# Patient Record
Sex: FEMALE | Race: WHITE | NOT HISPANIC OR LATINO | URBAN - METROPOLITAN AREA
[De-identification: names, ages, dates, MRNs, and addresses within clinical notes are randomized per-mention and may not be internally consistent; named-entity substitution may affect disease eponyms.]

---

## 2017-08-23 ENCOUNTER — OUTPATIENT (OUTPATIENT)
Dept: OUTPATIENT SERVICES | Facility: HOSPITAL | Age: 77
LOS: 1 days | Discharge: HOME | End: 2017-08-23

## 2017-08-23 DIAGNOSIS — Z12.31 ENCOUNTER FOR SCREENING MAMMOGRAM FOR MALIGNANT NEOPLASM OF BREAST: ICD-10-CM

## 2017-08-23 PROBLEM — Z00.00 ENCOUNTER FOR PREVENTIVE HEALTH EXAMINATION: Status: ACTIVE | Noted: 2017-08-23

## 2017-09-20 ENCOUNTER — APPOINTMENT (OUTPATIENT)
Dept: CARDIOLOGY | Facility: CLINIC | Age: 77
End: 2017-09-20

## 2017-09-20 VITALS
HEIGHT: 61 IN | DIASTOLIC BLOOD PRESSURE: 60 MMHG | SYSTOLIC BLOOD PRESSURE: 112 MMHG | HEART RATE: 65 BPM | WEIGHT: 123 LBS | BODY MASS INDEX: 23.22 KG/M2

## 2017-09-20 DIAGNOSIS — E78.2 MIXED HYPERLIPIDEMIA: ICD-10-CM

## 2017-09-20 DIAGNOSIS — K21.9 GASTRO-ESOPHAGEAL REFLUX DISEASE W/OUT ESOPHAGITIS: ICD-10-CM

## 2017-09-20 DIAGNOSIS — K52.9 NONINFECTIVE GASTROENTERITIS AND COLITIS, UNSPECIFIED: ICD-10-CM

## 2017-09-20 DIAGNOSIS — Z87.891 PERSONAL HISTORY OF NICOTINE DEPENDENCE: ICD-10-CM

## 2017-09-20 RX ORDER — ASPIRIN 81 MG
81 TABLET, DELAYED RELEASE (ENTERIC COATED) ORAL DAILY
Refills: 0 | Status: ACTIVE | COMMUNITY

## 2017-09-20 RX ORDER — ENALAPRIL MALEATE 10 MG/1
10 TABLET ORAL DAILY
Refills: 0 | Status: ACTIVE | COMMUNITY

## 2017-09-20 RX ORDER — ATORVASTATIN CALCIUM 40 MG/1
40 TABLET, FILM COATED ORAL
Qty: 30 | Refills: 3 | Status: ACTIVE | COMMUNITY

## 2017-10-11 ENCOUNTER — APPOINTMENT (OUTPATIENT)
Dept: VASCULAR SURGERY | Facility: CLINIC | Age: 77
End: 2017-10-11
Payer: MEDICARE

## 2017-10-11 ENCOUNTER — OTHER (OUTPATIENT)
Age: 77
End: 2017-10-11

## 2017-10-11 VITALS
BODY MASS INDEX: 23.22 KG/M2 | HEIGHT: 61 IN | DIASTOLIC BLOOD PRESSURE: 60 MMHG | SYSTOLIC BLOOD PRESSURE: 110 MMHG | OXYGEN SATURATION: 98 % | WEIGHT: 123 LBS | HEART RATE: 65 BPM

## 2017-10-11 DIAGNOSIS — I25.2 OLD MYOCARDIAL INFARCTION: ICD-10-CM

## 2017-10-11 PROCEDURE — 99203 OFFICE O/P NEW LOW 30 MIN: CPT

## 2017-10-11 PROCEDURE — 93925 LOWER EXTREMITY STUDY: CPT

## 2017-10-11 RX ORDER — OMEPRAZOLE 40 MG/1
40 CAPSULE, DELAYED RELEASE ORAL
Qty: 30 | Refills: 3 | Status: DISCONTINUED | COMMUNITY
End: 2017-10-11

## 2017-10-11 RX ORDER — BACILLUS COAGULANS/INULIN 1B-250 MG
CAPSULE ORAL
Refills: 0 | Status: DISCONTINUED | COMMUNITY
End: 2017-10-11

## 2017-10-18 ENCOUNTER — APPOINTMENT (OUTPATIENT)
Dept: CARDIOLOGY | Facility: CLINIC | Age: 77
End: 2017-10-18

## 2018-07-17 ENCOUNTER — APPOINTMENT (OUTPATIENT)
Dept: OBGYN | Facility: CLINIC | Age: 78
End: 2018-07-17

## 2018-11-28 ENCOUNTER — APPOINTMENT (OUTPATIENT)
Dept: OBGYN | Facility: CLINIC | Age: 78
End: 2018-11-28
Payer: MEDICARE

## 2018-11-28 VITALS
HEIGHT: 61 IN | WEIGHT: 138 LBS | BODY MASS INDEX: 26.06 KG/M2 | SYSTOLIC BLOOD PRESSURE: 150 MMHG | DIASTOLIC BLOOD PRESSURE: 90 MMHG

## 2018-11-28 DIAGNOSIS — Z80.1 FAMILY HISTORY OF MALIGNANT NEOPLASM OF TRACHEA, BRONCHUS AND LUNG: ICD-10-CM

## 2018-11-28 DIAGNOSIS — M19.90 UNSPECIFIED OSTEOARTHRITIS, UNSPECIFIED SITE: ICD-10-CM

## 2018-11-28 LAB
BILIRUB UR QL STRIP: NORMAL
GLUCOSE UR-MCNC: NORMAL
HCG UR QL: 0.2 EU/DL
HGB UR QL STRIP.AUTO: NORMAL
KETONES UR-MCNC: NORMAL
LEUKOCYTE ESTERASE UR QL STRIP: NORMAL
NITRITE UR QL STRIP: NORMAL
PH UR STRIP: 5
PROT UR STRIP-MCNC: NORMAL
SP GR UR STRIP: 1.02

## 2018-11-28 PROCEDURE — 99397 PER PM REEVAL EST PAT 65+ YR: CPT

## 2018-11-28 PROCEDURE — 81003 URINALYSIS AUTO W/O SCOPE: CPT | Mod: QW

## 2018-12-13 ENCOUNTER — APPOINTMENT (OUTPATIENT)
Dept: OBGYN | Facility: CLINIC | Age: 78
End: 2018-12-13
Payer: MEDICARE

## 2018-12-13 PROCEDURE — 77085 DXA BONE DENSITY AXL VRT FX: CPT

## 2019-01-02 ENCOUNTER — RX RENEWAL (OUTPATIENT)
Age: 79
End: 2019-01-02

## 2019-01-02 DIAGNOSIS — M85.852 OTHER SPECIFIED DISORDERS OF BONE DENSITY AND STRUCTURE, LEFT THIGH: ICD-10-CM

## 2019-12-09 ENCOUNTER — RX RENEWAL (OUTPATIENT)
Age: 79
End: 2019-12-09

## 2020-11-15 ENCOUNTER — TRANSCRIPTION ENCOUNTER (OUTPATIENT)
Age: 80
End: 2020-11-15

## 2020-11-18 ENCOUNTER — RX RENEWAL (OUTPATIENT)
Age: 80
End: 2020-11-18

## 2020-12-04 ENCOUNTER — OUTPATIENT (OUTPATIENT)
Dept: OUTPATIENT SERVICES | Facility: HOSPITAL | Age: 80
LOS: 1 days | Discharge: HOME | End: 2020-12-04
Payer: MEDICARE

## 2020-12-04 DIAGNOSIS — Z12.31 ENCOUNTER FOR SCREENING MAMMOGRAM FOR MALIGNANT NEOPLASM OF BREAST: ICD-10-CM

## 2020-12-04 PROCEDURE — 77063 BREAST TOMOSYNTHESIS BI: CPT | Mod: 26

## 2020-12-04 PROCEDURE — 77067 SCR MAMMO BI INCL CAD: CPT | Mod: 26

## 2021-07-12 ENCOUNTER — APPOINTMENT (OUTPATIENT)
Age: 81
End: 2021-07-12
Payer: MEDICARE

## 2021-07-12 ENCOUNTER — NON-APPOINTMENT (OUTPATIENT)
Age: 81
End: 2021-07-12

## 2021-07-12 VITALS
HEIGHT: 61 IN | HEART RATE: 76 BPM | RESPIRATION RATE: 14 BRPM | DIASTOLIC BLOOD PRESSURE: 88 MMHG | SYSTOLIC BLOOD PRESSURE: 144 MMHG | WEIGHT: 139 LBS | BODY MASS INDEX: 26.24 KG/M2 | OXYGEN SATURATION: 97 %

## 2021-07-12 PROCEDURE — 99203 OFFICE O/P NEW LOW 30 MIN: CPT | Mod: 25

## 2021-07-12 PROCEDURE — 71046 X-RAY EXAM CHEST 2 VIEWS: CPT

## 2021-07-12 RX ORDER — BUDESONIDE AND FORMOTEROL FUMARATE DIHYDRATE 160; 4.5 UG/1; UG/1
160-4.5 AEROSOL RESPIRATORY (INHALATION) TWICE DAILY
Qty: 1 | Refills: 3 | Status: ACTIVE | COMMUNITY
Start: 2021-07-12 | End: 1900-01-01

## 2021-07-12 RX ORDER — ALBUTEROL SULFATE 90 UG/1
108 (90 BASE) AEROSOL, METERED RESPIRATORY (INHALATION)
Qty: 1 | Refills: 2 | Status: ACTIVE | COMMUNITY
Start: 2021-07-12

## 2021-07-12 NOTE — PROCEDURE
[FreeTextEntry1] : CXR PA and Lateral \par The costophrenic and cardiophrenic angles are sharp\par The louie parenchyma shows increased bibasilar interstitial opacities.  No consolidations, or nodules \par The Mediastinum is within normal limits\par No pleural effusions\par

## 2021-07-12 NOTE — HISTORY OF PRESENT ILLNESS
[Dyspnea on Exertion] : dyspnea on exertion [None] : The patient is not currently being treated for this problem [Initial Evaluation] : an initial evaluation of [Cough] : cough [Non-Productive Cough] : non-productive cough [Currently Experiencing] : The patient is currently experiencing symptoms.

## 2021-07-12 NOTE — ASSESSMENT
[FreeTextEntry1] : HO moderate COPD \par Was not seen in 5 years\par Increasing cough likely COPD \par Significant smoking history quit 12 years ago \par Being treated for GERD

## 2021-08-23 ENCOUNTER — APPOINTMENT (OUTPATIENT)
Age: 81
End: 2021-08-23
Payer: MEDICARE

## 2021-08-23 VITALS
WEIGHT: 140 LBS | RESPIRATION RATE: 14 BRPM | HEIGHT: 61 IN | BODY MASS INDEX: 26.43 KG/M2 | SYSTOLIC BLOOD PRESSURE: 124 MMHG | OXYGEN SATURATION: 96 % | DIASTOLIC BLOOD PRESSURE: 80 MMHG | HEART RATE: 57 BPM

## 2021-08-23 PROCEDURE — 99214 OFFICE O/P EST MOD 30 MIN: CPT

## 2021-08-23 NOTE — HISTORY OF PRESENT ILLNESS
[Doing Well] : doing well [None] : The patient is not currently on any medications for ~his/her~ COPD [Adherent] : the patient is adherent with ~his/her~ medication regimen [Goals--Doing Well] : the patient is doing well with ~his/her~ COPD goals [PFTs] : pulmonary function tests [Initial Evaluation] : an initial evaluation of [Cough] : no cough [Non-Productive Cough] : no non-productive cough [Currently Experiencing] : The patient is currently experiencing symptoms.

## 2021-08-23 NOTE — ASSESSMENT
[FreeTextEntry1] : HO moderate COPD \par Increasing cough likely secondary to COPD.  Resolved with therapy  \par Significant smoking history quit 12 years ago \par Being treated for GERD

## 2021-10-18 ENCOUNTER — APPOINTMENT (OUTPATIENT)
Dept: OBGYN | Facility: CLINIC | Age: 81
End: 2021-10-18
Payer: MEDICARE

## 2021-10-18 VITALS
SYSTOLIC BLOOD PRESSURE: 128 MMHG | HEIGHT: 61 IN | DIASTOLIC BLOOD PRESSURE: 72 MMHG | BODY MASS INDEX: 26.43 KG/M2 | TEMPERATURE: 98.1 F | WEIGHT: 140 LBS

## 2021-10-18 DIAGNOSIS — Z01.419 ENCOUNTER FOR GYNECOLOGICAL EXAMINATION (GENERAL) (ROUTINE) W/OUT ABNORMAL FINDINGS: ICD-10-CM

## 2021-10-18 PROCEDURE — 99397 PER PM REEVAL EST PAT 65+ YR: CPT

## 2022-01-15 ENCOUNTER — INPATIENT (INPATIENT)
Facility: HOSPITAL | Age: 82
LOS: 2 days | Discharge: HOME | End: 2022-01-18
Attending: COLON & RECTAL SURGERY | Admitting: COLON & RECTAL SURGERY
Payer: MEDICARE

## 2022-01-15 VITALS
TEMPERATURE: 98 F | WEIGHT: 138.01 LBS | HEART RATE: 84 BPM | DIASTOLIC BLOOD PRESSURE: 64 MMHG | RESPIRATION RATE: 18 BRPM | SYSTOLIC BLOOD PRESSURE: 116 MMHG | OXYGEN SATURATION: 98 %

## 2022-01-15 DIAGNOSIS — K56.609 UNSPECIFIED INTESTINAL OBSTRUCTION, UNSPECIFIED AS TO PARTIAL VERSUS COMPLETE OBSTRUCTION: ICD-10-CM

## 2022-01-15 LAB
ALBUMIN SERPL ELPH-MCNC: 4.5 G/DL — SIGNIFICANT CHANGE UP (ref 3.5–5.2)
ALP SERPL-CCNC: 76 U/L — SIGNIFICANT CHANGE UP (ref 30–115)
ALT FLD-CCNC: 20 U/L — SIGNIFICANT CHANGE UP (ref 0–41)
ANION GAP SERPL CALC-SCNC: 13 MMOL/L — SIGNIFICANT CHANGE UP (ref 7–14)
APPEARANCE UR: CLEAR — SIGNIFICANT CHANGE UP
AST SERPL-CCNC: 26 U/L — SIGNIFICANT CHANGE UP (ref 0–41)
BACTERIA # UR AUTO: ABNORMAL
BASOPHILS # BLD AUTO: 0.05 K/UL — SIGNIFICANT CHANGE UP (ref 0–0.2)
BASOPHILS NFR BLD AUTO: 0.5 % — SIGNIFICANT CHANGE UP (ref 0–1)
BILIRUB SERPL-MCNC: 0.4 MG/DL — SIGNIFICANT CHANGE UP (ref 0.2–1.2)
BILIRUB UR-MCNC: NEGATIVE — SIGNIFICANT CHANGE UP
BUN SERPL-MCNC: 39 MG/DL — HIGH (ref 10–20)
CALCIUM SERPL-MCNC: 10.1 MG/DL — SIGNIFICANT CHANGE UP (ref 8.5–10.1)
CHLORIDE SERPL-SCNC: 99 MMOL/L — SIGNIFICANT CHANGE UP (ref 98–110)
CO2 SERPL-SCNC: 28 MMOL/L — SIGNIFICANT CHANGE UP (ref 17–32)
COLOR SPEC: YELLOW — SIGNIFICANT CHANGE UP
CREAT SERPL-MCNC: 1.7 MG/DL — HIGH (ref 0.7–1.5)
DIFF PNL FLD: NEGATIVE — SIGNIFICANT CHANGE UP
EOSINOPHIL # BLD AUTO: 0.12 K/UL — SIGNIFICANT CHANGE UP (ref 0–0.7)
EOSINOPHIL NFR BLD AUTO: 1.2 % — SIGNIFICANT CHANGE UP (ref 0–8)
EPI CELLS # UR: ABNORMAL /HPF
GLUCOSE SERPL-MCNC: 117 MG/DL — HIGH (ref 70–99)
GLUCOSE UR QL: NEGATIVE MG/DL — SIGNIFICANT CHANGE UP
HCT VFR BLD CALC: 43.2 % — SIGNIFICANT CHANGE UP (ref 37–47)
HGB BLD-MCNC: 14.3 G/DL — SIGNIFICANT CHANGE UP (ref 12–16)
IMM GRANULOCYTES NFR BLD AUTO: 0.4 % — HIGH (ref 0.1–0.3)
KETONES UR-MCNC: 15
LACTATE SERPL-SCNC: 0.9 MMOL/L — SIGNIFICANT CHANGE UP (ref 0.7–2)
LEUKOCYTE ESTERASE UR-ACNC: NEGATIVE — SIGNIFICANT CHANGE UP
LIDOCAIN IGE QN: 41 U/L — SIGNIFICANT CHANGE UP (ref 7–60)
LYMPHOCYTES # BLD AUTO: 19.2 % — LOW (ref 20.5–51.1)
LYMPHOCYTES # BLD AUTO: 2 K/UL — SIGNIFICANT CHANGE UP (ref 1.2–3.4)
MCHC RBC-ENTMCNC: 31.6 PG — HIGH (ref 27–31)
MCHC RBC-ENTMCNC: 33.1 G/DL — SIGNIFICANT CHANGE UP (ref 32–37)
MCV RBC AUTO: 95.6 FL — SIGNIFICANT CHANGE UP (ref 81–99)
MONOCYTES # BLD AUTO: 1.09 K/UL — HIGH (ref 0.1–0.6)
MONOCYTES NFR BLD AUTO: 10.5 % — HIGH (ref 1.7–9.3)
NEUTROPHILS # BLD AUTO: 7.12 K/UL — HIGH (ref 1.4–6.5)
NEUTROPHILS NFR BLD AUTO: 68.2 % — SIGNIFICANT CHANGE UP (ref 42.2–75.2)
NITRITE UR-MCNC: NEGATIVE — SIGNIFICANT CHANGE UP
NRBC # BLD: 0 /100 WBCS — SIGNIFICANT CHANGE UP (ref 0–0)
PH UR: 6.5 — SIGNIFICANT CHANGE UP (ref 5–8)
PLATELET # BLD AUTO: 354 K/UL — SIGNIFICANT CHANGE UP (ref 130–400)
POTASSIUM SERPL-MCNC: 4.7 MMOL/L — SIGNIFICANT CHANGE UP (ref 3.5–5)
POTASSIUM SERPL-SCNC: 4.7 MMOL/L — SIGNIFICANT CHANGE UP (ref 3.5–5)
PROT SERPL-MCNC: 7.8 G/DL — SIGNIFICANT CHANGE UP (ref 6–8)
PROT UR-MCNC: 30 MG/DL
RBC # BLD: 4.52 M/UL — SIGNIFICANT CHANGE UP (ref 4.2–5.4)
RBC # FLD: 13.2 % — SIGNIFICANT CHANGE UP (ref 11.5–14.5)
SARS-COV-2 RNA SPEC QL NAA+PROBE: SIGNIFICANT CHANGE UP
SODIUM SERPL-SCNC: 140 MMOL/L — SIGNIFICANT CHANGE UP (ref 135–146)
SP GR SPEC: 1.01 — SIGNIFICANT CHANGE UP (ref 1.01–1.03)
TROPONIN T SERPL-MCNC: <0.01 NG/ML — SIGNIFICANT CHANGE UP
UROBILINOGEN FLD QL: 1 MG/DL
WBC # BLD: 10.42 K/UL — SIGNIFICANT CHANGE UP (ref 4.8–10.8)
WBC # FLD AUTO: 10.42 K/UL — SIGNIFICANT CHANGE UP (ref 4.8–10.8)

## 2022-01-15 PROCEDURE — 93010 ELECTROCARDIOGRAM REPORT: CPT

## 2022-01-15 PROCEDURE — 74177 CT ABD & PELVIS W/CONTRAST: CPT | Mod: 26,MA

## 2022-01-15 PROCEDURE — 71045 X-RAY EXAM CHEST 1 VIEW: CPT | Mod: 26

## 2022-01-15 PROCEDURE — 99285 EMERGENCY DEPT VISIT HI MDM: CPT

## 2022-01-15 PROCEDURE — 99283 EMERGENCY DEPT VISIT LOW MDM: CPT

## 2022-01-15 RX ORDER — MORPHINE SULFATE 50 MG/1
4 CAPSULE, EXTENDED RELEASE ORAL ONCE
Refills: 0 | Status: DISCONTINUED | OUTPATIENT
Start: 2022-01-15 | End: 2022-01-15

## 2022-01-15 RX ORDER — ONDANSETRON 8 MG/1
4 TABLET, FILM COATED ORAL ONCE
Refills: 0 | Status: COMPLETED | OUTPATIENT
Start: 2022-01-15 | End: 2022-01-15

## 2022-01-15 RX ORDER — SODIUM CHLORIDE 9 MG/ML
1000 INJECTION INTRAMUSCULAR; INTRAVENOUS; SUBCUTANEOUS ONCE
Refills: 0 | Status: COMPLETED | OUTPATIENT
Start: 2022-01-15 | End: 2022-01-15

## 2022-01-15 RX ORDER — BUDESONIDE AND FORMOTEROL FUMARATE DIHYDRATE 160; 4.5 UG/1; UG/1
2 AEROSOL RESPIRATORY (INHALATION)
Refills: 0 | Status: DISCONTINUED | OUTPATIENT
Start: 2022-01-15 | End: 2022-01-18

## 2022-01-15 RX ORDER — HYDROMORPHONE HYDROCHLORIDE 2 MG/ML
1 INJECTION INTRAMUSCULAR; INTRAVENOUS; SUBCUTANEOUS EVERY 4 HOURS
Refills: 0 | Status: DISCONTINUED | OUTPATIENT
Start: 2022-01-15 | End: 2022-01-16

## 2022-01-15 RX ORDER — SODIUM CHLORIDE 9 MG/ML
1000 INJECTION, SOLUTION INTRAVENOUS
Refills: 0 | Status: DISCONTINUED | OUTPATIENT
Start: 2022-01-15 | End: 2022-01-17

## 2022-01-15 RX ORDER — ONDANSETRON 8 MG/1
4 TABLET, FILM COATED ORAL EVERY 6 HOURS
Refills: 0 | Status: DISCONTINUED | OUTPATIENT
Start: 2022-01-15 | End: 2022-01-16

## 2022-01-15 RX ORDER — HEPARIN SODIUM 5000 [USP'U]/ML
5000 INJECTION INTRAVENOUS; SUBCUTANEOUS EVERY 12 HOURS
Refills: 0 | Status: DISCONTINUED | OUTPATIENT
Start: 2022-01-15 | End: 2022-01-16

## 2022-01-15 RX ORDER — PANTOPRAZOLE SODIUM 20 MG/1
40 TABLET, DELAYED RELEASE ORAL DAILY
Refills: 0 | Status: DISCONTINUED | OUTPATIENT
Start: 2022-01-15 | End: 2022-01-18

## 2022-01-15 RX ADMIN — SODIUM CHLORIDE 100 MILLILITER(S): 9 INJECTION, SOLUTION INTRAVENOUS at 22:31

## 2022-01-15 RX ADMIN — ONDANSETRON 4 MILLIGRAM(S): 8 TABLET, FILM COATED ORAL at 18:07

## 2022-01-15 RX ADMIN — SODIUM CHLORIDE 1000 MILLILITER(S): 9 INJECTION INTRAMUSCULAR; INTRAVENOUS; SUBCUTANEOUS at 18:07

## 2022-01-15 RX ADMIN — HYDROMORPHONE HYDROCHLORIDE 1 MILLIGRAM(S): 2 INJECTION INTRAMUSCULAR; INTRAVENOUS; SUBCUTANEOUS at 22:27

## 2022-01-15 RX ADMIN — MORPHINE SULFATE 4 MILLIGRAM(S): 50 CAPSULE, EXTENDED RELEASE ORAL at 18:07

## 2022-01-15 NOTE — ED PROVIDER NOTE - PHYSICAL EXAMINATION
Physical Exam    Vital Signs: I have reviewed the initial vital signs.  Constitutional: well-nourished, appears stated age, no acute distress  Eyes: Conjunctiva pink, Sclera clear.  Cardiovascular: S1 and S2, regular rate, regular rhythm, well-perfused extremities, radial pulses equal and 2+  Respiratory: unlabored respiratory effort, clear to auscultation bilaterally no wheezing, rales and rhonchi  Gastrointestinal: soft, epigastric ttp, no guarding or rebound, no pulsatile mass, normal bowl sounds  Musculoskeletal: supple neck, no lower extremity edema, no midline tenderness  Integumentary: warm, dry, no rash  Neurologic: awake, alert, nvi

## 2022-01-15 NOTE — H&P ADULT - PROBLEM SELECTOR PLAN 1
NPO  NGT LCWS  IVF  LR  100 CC/ HR  PAIN MGMT  MORPHINE  2  MG Q 4  HR  PRN  ZOFRAN PRN  NAUSEA  DVT  / GI  PROPHYLAXIS  WILL FOLLOW

## 2022-01-15 NOTE — ED PROVIDER NOTE - CLINICAL SUMMARY MEDICAL DECISION MAKING FREE TEXT BOX
81 year old female with a pmh htn hld gerd copd w/ a history of 2 years of rlq abdominal pain (states has seen multiple specialist told it was muscular) presents here c/o diffuse abdominal pain that began last night, improved and reoccured today, Currently pain is 4/10 dull in nature associated with an episode of vomiting VS reviewed. Labs imaging ekg obtained and reviewed. CT demonstrated SBO - surgery consulted NG tube placed. patient admitted.

## 2022-01-15 NOTE — H&P ADULT - ATTENDING COMMENTS
Patient is a 81F with PMH of HTN, HLD, COPD who presents with abdominal pain found to have SBO on CTAP.  Reports BM today and last colonoscopy >10 years ago    Patient seen and examined.      Abdomen - soft, mildly distended, minimally tender    Vitals labs and images reviewed    CTAP Findings compatible with a small bowel obstruction and apparent transition point in the mid lower abdomen (4/201-194). No free air or ascites.  Moderate to severe bilateral centrilobular emphysema.  Innumerable hepatic hypodensities, too small to further characterize. Differential includes biliary hamartomas, infectious process and metastatic disease.    Plan  - NPO  - IVF  - NGT  - OOB ambulate  - f/u bowel function  - I had a long conversation with the patient and her family regarding her care.  We discussed a course of conservative management with NGT followe by surgery if she fails to resolve. They expressed understanding.

## 2022-01-15 NOTE — H&P ADULT - HISTORY OF PRESENT ILLNESS
Chief Complaint: abdominal pain.    · Chief Complaint: The patient is a 81y Female complaining of abdominal pain.  · HPI Objective Statement: 80 yo female, pmh of htn, hld, copd,  psh   denies  presents to ed for epigastric pain, x 2 days, mild, aching, no radiation, a.w nausea. denies fever, chills, cp, sob, v/d, dysuria.last  bm today- regular, last colonoscopy  >10  yr  ago. NGT  PLACED  IN  ED-  500  CC  BILE OUTPUT

## 2022-01-15 NOTE — H&P ADULT - NSHPROSALLOTHERNEGRD_GEN_ALL_CORE
Continued Stay Note  [facility]     Patient Name: Sada Le  MRN: [id number]  Today's Date: 10/4/2019    Admit Date: 9/29/2019    Discharge Plan     Row Name 10/04/19 0852       Plan    Plan  D/C plan is [facility] home care . Brilinta has been approved with 0.00 cost to pt . Watch for possible life vest.    Plan Comments  Brilinta was approved per fax. Called [facility] pharmacy and they have also gotten the approval with 0.00 cost to pt .        Discharge Codes    No documentation.       Expected Discharge Date and Time     Expected Discharge Date Expected Discharge Time    Oct 4, 2019             Anat Khanna RN     All other review of systems negative, except as noted in HPI

## 2022-01-15 NOTE — ED PROVIDER NOTE - OBJECTIVE STATEMENT
80 yo female, pmh of htn, hld, copd, presents to ed for epigastric pain, x 2 days, mild, aching, no radiation, a.w nausea. denies fever, chills, cp, sob, v/d, dysuria.

## 2022-01-16 LAB
ALBUMIN SERPL ELPH-MCNC: 4.3 G/DL — SIGNIFICANT CHANGE UP (ref 3.5–5.2)
ALP SERPL-CCNC: 69 U/L — SIGNIFICANT CHANGE UP (ref 30–115)
ALT FLD-CCNC: 16 U/L — SIGNIFICANT CHANGE UP (ref 0–41)
ANION GAP SERPL CALC-SCNC: 14 MMOL/L — SIGNIFICANT CHANGE UP (ref 7–14)
AST SERPL-CCNC: 24 U/L — SIGNIFICANT CHANGE UP (ref 0–41)
BILIRUB SERPL-MCNC: 0.4 MG/DL — SIGNIFICANT CHANGE UP (ref 0.2–1.2)
BUN SERPL-MCNC: 31 MG/DL — HIGH (ref 10–20)
CALCIUM SERPL-MCNC: 9.2 MG/DL — SIGNIFICANT CHANGE UP (ref 8.5–10.1)
CHLORIDE SERPL-SCNC: 102 MMOL/L — SIGNIFICANT CHANGE UP (ref 98–110)
CO2 SERPL-SCNC: 22 MMOL/L — SIGNIFICANT CHANGE UP (ref 17–32)
CREAT SERPL-MCNC: 1.1 MG/DL — SIGNIFICANT CHANGE UP (ref 0.7–1.5)
GLUCOSE SERPL-MCNC: 109 MG/DL — HIGH (ref 70–99)
HCT VFR BLD CALC: 42.5 % — SIGNIFICANT CHANGE UP (ref 37–47)
HGB BLD-MCNC: 13.6 G/DL — SIGNIFICANT CHANGE UP (ref 12–16)
MCHC RBC-ENTMCNC: 31.9 PG — HIGH (ref 27–31)
MCHC RBC-ENTMCNC: 32 G/DL — SIGNIFICANT CHANGE UP (ref 32–37)
MCV RBC AUTO: 99.5 FL — HIGH (ref 81–99)
NRBC # BLD: 0 /100 WBCS — SIGNIFICANT CHANGE UP (ref 0–0)
PLATELET # BLD AUTO: 228 K/UL — SIGNIFICANT CHANGE UP (ref 130–400)
POTASSIUM SERPL-MCNC: 4.9 MMOL/L — SIGNIFICANT CHANGE UP (ref 3.5–5)
POTASSIUM SERPL-SCNC: 4.9 MMOL/L — SIGNIFICANT CHANGE UP (ref 3.5–5)
PROT SERPL-MCNC: 7 G/DL — SIGNIFICANT CHANGE UP (ref 6–8)
RBC # BLD: 4.27 M/UL — SIGNIFICANT CHANGE UP (ref 4.2–5.4)
RBC # FLD: 13.4 % — SIGNIFICANT CHANGE UP (ref 11.5–14.5)
SODIUM SERPL-SCNC: 138 MMOL/L — SIGNIFICANT CHANGE UP (ref 135–146)
WBC # BLD: 9.92 K/UL — SIGNIFICANT CHANGE UP (ref 4.8–10.8)
WBC # FLD AUTO: 9.92 K/UL — SIGNIFICANT CHANGE UP (ref 4.8–10.8)

## 2022-01-16 PROCEDURE — 99232 SBSQ HOSP IP/OBS MODERATE 35: CPT

## 2022-01-16 PROCEDURE — 71045 X-RAY EXAM CHEST 1 VIEW: CPT | Mod: 26

## 2022-01-16 RX ORDER — ACETAMINOPHEN 500 MG
1000 TABLET ORAL ONCE
Refills: 0 | Status: COMPLETED | OUTPATIENT
Start: 2022-01-16 | End: 2022-01-16

## 2022-01-16 RX ORDER — HEPARIN SODIUM 5000 [USP'U]/ML
5000 INJECTION INTRAVENOUS; SUBCUTANEOUS EVERY 8 HOURS
Refills: 0 | Status: DISCONTINUED | OUTPATIENT
Start: 2022-01-16 | End: 2022-01-18

## 2022-01-16 RX ORDER — ACETAMINOPHEN 500 MG
650 TABLET ORAL EVERY 6 HOURS
Refills: 0 | Status: DISCONTINUED | OUTPATIENT
Start: 2022-01-16 | End: 2022-01-18

## 2022-01-16 RX ORDER — MORPHINE SULFATE 50 MG/1
2 CAPSULE, EXTENDED RELEASE ORAL EVERY 6 HOURS
Refills: 0 | Status: DISCONTINUED | OUTPATIENT
Start: 2022-01-16 | End: 2022-01-18

## 2022-01-16 RX ORDER — HEPARIN SODIUM 5000 [USP'U]/ML
5000 INJECTION INTRAVENOUS; SUBCUTANEOUS EVERY 12 HOURS
Refills: 0 | Status: DISCONTINUED | OUTPATIENT
Start: 2022-01-16 | End: 2022-01-16

## 2022-01-16 RX ORDER — AMLODIPINE BESYLATE 2.5 MG/1
5 TABLET ORAL EVERY 24 HOURS
Refills: 0 | Status: DISCONTINUED | OUTPATIENT
Start: 2022-01-16 | End: 2022-01-18

## 2022-01-16 RX ORDER — DIATRIZOATE MEGLUMINE 180 MG/ML
30 INJECTION, SOLUTION INTRAVESICAL ONCE
Refills: 0 | Status: DISCONTINUED | OUTPATIENT
Start: 2022-01-16 | End: 2022-01-16

## 2022-01-16 RX ADMIN — Medication 1000 MILLIGRAM(S): at 13:11

## 2022-01-16 RX ADMIN — ONDANSETRON 4 MILLIGRAM(S): 8 TABLET, FILM COATED ORAL at 04:59

## 2022-01-16 RX ADMIN — AMLODIPINE BESYLATE 5 MILLIGRAM(S): 2.5 TABLET ORAL at 11:20

## 2022-01-16 RX ADMIN — BUDESONIDE AND FORMOTEROL FUMARATE DIHYDRATE 2 PUFF(S): 160; 4.5 AEROSOL RESPIRATORY (INHALATION) at 10:05

## 2022-01-16 RX ADMIN — PANTOPRAZOLE SODIUM 40 MILLIGRAM(S): 20 TABLET, DELAYED RELEASE ORAL at 12:42

## 2022-01-16 RX ADMIN — Medication 650 MILLIGRAM(S): at 22:44

## 2022-01-16 RX ADMIN — HEPARIN SODIUM 5000 UNIT(S): 5000 INJECTION INTRAVENOUS; SUBCUTANEOUS at 16:49

## 2022-01-16 RX ADMIN — HEPARIN SODIUM 5000 UNIT(S): 5000 INJECTION INTRAVENOUS; SUBCUTANEOUS at 22:44

## 2022-01-16 RX ADMIN — Medication 400 MILLIGRAM(S): at 12:41

## 2022-01-16 RX ADMIN — HYDROMORPHONE HYDROCHLORIDE 1 MILLIGRAM(S): 2 INJECTION INTRAMUSCULAR; INTRAVENOUS; SUBCUTANEOUS at 05:08

## 2022-01-16 NOTE — PROGRESS NOTE ADULT - ASSESSMENT
Assessment:  81y Female patient admitted with SBO no prior history of surgeries, no palpable hernias on physical examination, no history of IBD, last C-scope 9 years ago reportedly normal, no history of GI bleed/weight loss    Plan:  - gastrograffin challenge today, will plan for obstructive series 4 hours post contrast  - NPO e meds, IVF  - restart home medication  - monitor for bowel function  - d/c eden cathter  - f/u TOV  - PT consult to mobilize patient  -Pain control as needed  -Hemodynamic monitoring as per routine  -Encourage ambulation and incentive spirometer use (10x/hr when awake)  -GI and DVT prophylaxis  -Check and replete CBC and BMP q daily  -Strict input and output monitoring  -Continue current management    Date/Time: 01-16-22 @ 08:59   Assessment:  81y Female patient admitted with SBO no prior history of surgeries, no palpable hernias on physical examination, no history of IBD, last C-scope 9 years ago reportedly normal, no history of GI bleed/weight loss    Plan:  - gastrograffin challenge tomorrow  - NPO e meds, IVF  - restart home medication  - monitor for bowel function  - d/c eden cathter  - f/u TOV  - PT consult to mobilize patient  -Pain control as needed  -Hemodynamic monitoring as per routine  -Encourage ambulation and incentive spirometer use (10x/hr when awake)  -GI and DVT prophylaxis  -Check and replete CBC and BMP q daily  -Strict input and output monitoring  -Continue current management    Date/Time: 01-16-22 @ 08:59

## 2022-01-16 NOTE — PATIENT PROFILE ADULT - FALL HARM RISK - UNIVERSAL INTERVENTIONS
Bed in lowest position, wheels locked, appropriate side rails in place/Call bell, personal items and telephone in reach/Instruct patient to call for assistance before getting out of bed or chair/Non-slip footwear when patient is out of bed/[place] to call system/Physically safe environment - no spills, clutter or unnecessary equipment/Purposeful Proactive Rounding/Room/bathroom lighting operational, light cord in reach

## 2022-01-16 NOTE — CHART NOTE - NSCHARTNOTEFT_GEN_A_CORE
Pt seen when arrived on floor: tape on NGT is off and NGT is at 55 cm (earlier it was at 65 cm)  NGT retaped. Will get CXR to verify position -- will F/U

## 2022-01-17 LAB
ANION GAP SERPL CALC-SCNC: 14 MMOL/L — SIGNIFICANT CHANGE UP (ref 7–14)
BUN SERPL-MCNC: 22 MG/DL — HIGH (ref 10–20)
CALCIUM SERPL-MCNC: 9.1 MG/DL — SIGNIFICANT CHANGE UP (ref 8.5–10.1)
CHLORIDE SERPL-SCNC: 99 MMOL/L — SIGNIFICANT CHANGE UP (ref 98–110)
CO2 SERPL-SCNC: 25 MMOL/L — SIGNIFICANT CHANGE UP (ref 17–32)
CREAT SERPL-MCNC: 1 MG/DL — SIGNIFICANT CHANGE UP (ref 0.7–1.5)
CULTURE RESULTS: NO GROWTH — SIGNIFICANT CHANGE UP
GLUCOSE SERPL-MCNC: 63 MG/DL — LOW (ref 70–99)
HCT VFR BLD CALC: 37.2 % — SIGNIFICANT CHANGE UP (ref 37–47)
HGB BLD-MCNC: 12 G/DL — SIGNIFICANT CHANGE UP (ref 12–16)
MAGNESIUM SERPL-MCNC: 1.8 MG/DL — SIGNIFICANT CHANGE UP (ref 1.8–2.4)
MCHC RBC-ENTMCNC: 31.6 PG — HIGH (ref 27–31)
MCHC RBC-ENTMCNC: 32.3 G/DL — SIGNIFICANT CHANGE UP (ref 32–37)
MCV RBC AUTO: 97.9 FL — SIGNIFICANT CHANGE UP (ref 81–99)
NRBC # BLD: 0 /100 WBCS — SIGNIFICANT CHANGE UP (ref 0–0)
PHOSPHATE SERPL-MCNC: 3.1 MG/DL — SIGNIFICANT CHANGE UP (ref 2.1–4.9)
PLATELET # BLD AUTO: 263 K/UL — SIGNIFICANT CHANGE UP (ref 130–400)
POTASSIUM SERPL-MCNC: 4.2 MMOL/L — SIGNIFICANT CHANGE UP (ref 3.5–5)
POTASSIUM SERPL-SCNC: 4.2 MMOL/L — SIGNIFICANT CHANGE UP (ref 3.5–5)
RBC # BLD: 3.8 M/UL — LOW (ref 4.2–5.4)
RBC # FLD: 13.2 % — SIGNIFICANT CHANGE UP (ref 11.5–14.5)
SODIUM SERPL-SCNC: 138 MMOL/L — SIGNIFICANT CHANGE UP (ref 135–146)
SPECIMEN SOURCE: SIGNIFICANT CHANGE UP
WBC # BLD: 10.68 K/UL — SIGNIFICANT CHANGE UP (ref 4.8–10.8)
WBC # FLD AUTO: 10.68 K/UL — SIGNIFICANT CHANGE UP (ref 4.8–10.8)

## 2022-01-17 PROCEDURE — 99231 SBSQ HOSP IP/OBS SF/LOW 25: CPT

## 2022-01-17 PROCEDURE — 74019 RADEX ABDOMEN 2 VIEWS: CPT | Mod: 26

## 2022-01-17 RX ORDER — SODIUM CHLORIDE 9 MG/ML
1000 INJECTION, SOLUTION INTRAVENOUS
Refills: 0 | Status: DISCONTINUED | OUTPATIENT
Start: 2022-01-17 | End: 2022-01-18

## 2022-01-17 RX ORDER — DIPHENHYDRAMINE HCL 50 MG
25 CAPSULE ORAL ONCE
Refills: 0 | Status: COMPLETED | OUTPATIENT
Start: 2022-01-17 | End: 2022-01-17

## 2022-01-17 RX ORDER — LIDOCAINE 4 G/100G
5 CREAM TOPICAL
Refills: 0 | Status: DISCONTINUED | OUTPATIENT
Start: 2022-01-17 | End: 2022-01-18

## 2022-01-17 RX ORDER — MAGNESIUM SULFATE 500 MG/ML
2 VIAL (ML) INJECTION ONCE
Refills: 0 | Status: COMPLETED | OUTPATIENT
Start: 2022-01-17 | End: 2022-01-17

## 2022-01-17 RX ORDER — IOHEXOL 300 MG/ML
30 INJECTION, SOLUTION INTRAVENOUS ONCE
Refills: 0 | Status: COMPLETED | OUTPATIENT
Start: 2022-01-17 | End: 2022-01-17

## 2022-01-17 RX ADMIN — BUDESONIDE AND FORMOTEROL FUMARATE DIHYDRATE 2 PUFF(S): 160; 4.5 AEROSOL RESPIRATORY (INHALATION) at 09:32

## 2022-01-17 RX ADMIN — PANTOPRAZOLE SODIUM 40 MILLIGRAM(S): 20 TABLET, DELAYED RELEASE ORAL at 14:28

## 2022-01-17 RX ADMIN — SODIUM CHLORIDE 40 MILLILITER(S): 9 INJECTION, SOLUTION INTRAVENOUS at 21:51

## 2022-01-17 RX ADMIN — IOHEXOL 30 MILLILITER(S): 300 INJECTION, SOLUTION INTRAVENOUS at 08:15

## 2022-01-17 RX ADMIN — Medication 25 MILLIGRAM(S): at 00:56

## 2022-01-17 RX ADMIN — Medication 25 GRAM(S): at 14:30

## 2022-01-17 RX ADMIN — HEPARIN SODIUM 5000 UNIT(S): 5000 INJECTION INTRAVENOUS; SUBCUTANEOUS at 14:31

## 2022-01-17 NOTE — PROGRESS NOTE ADULT - ASSESSMENT
81y Female patient admitted with SBO no prior history of surgeries, no palpable hernias on physical examination, no history of IBD, last C-scope 9 years ago reportedly normal, no history of GI bleed/weight loss  Now passing some flatus    Plan:  - omnipaque administered via NGT at 8 am - will get obstructive series in 2-3 hrs  -  NPO e meds, IVF  - cont home medication  - cont monitor for bowel function  - PT consult to mobilize patient  -Pain control as needed  -Hemodynamic monitoring as per routine

## 2022-01-18 ENCOUNTER — TRANSCRIPTION ENCOUNTER (OUTPATIENT)
Age: 82
End: 2022-01-18

## 2022-01-18 VITALS
RESPIRATION RATE: 18 BRPM | DIASTOLIC BLOOD PRESSURE: 70 MMHG | SYSTOLIC BLOOD PRESSURE: 144 MMHG | TEMPERATURE: 97 F | HEART RATE: 78 BPM

## 2022-01-18 LAB
ANION GAP SERPL CALC-SCNC: 19 MMOL/L — HIGH (ref 7–14)
BUN SERPL-MCNC: 19 MG/DL — SIGNIFICANT CHANGE UP (ref 10–20)
CALCIUM SERPL-MCNC: 8.9 MG/DL — SIGNIFICANT CHANGE UP (ref 8.5–10.1)
CHLORIDE SERPL-SCNC: 100 MMOL/L — SIGNIFICANT CHANGE UP (ref 98–110)
CO2 SERPL-SCNC: 23 MMOL/L — SIGNIFICANT CHANGE UP (ref 17–32)
CREAT SERPL-MCNC: 1 MG/DL — SIGNIFICANT CHANGE UP (ref 0.7–1.5)
GLUCOSE BLDC GLUCOMTR-MCNC: 74 MG/DL — SIGNIFICANT CHANGE UP (ref 70–99)
GLUCOSE SERPL-MCNC: 80 MG/DL — SIGNIFICANT CHANGE UP (ref 70–99)
HCT VFR BLD CALC: 40.8 % — SIGNIFICANT CHANGE UP (ref 37–47)
HGB BLD-MCNC: 13.3 G/DL — SIGNIFICANT CHANGE UP (ref 12–16)
MAGNESIUM SERPL-MCNC: 2.2 MG/DL — SIGNIFICANT CHANGE UP (ref 1.8–2.4)
MCHC RBC-ENTMCNC: 31.1 PG — HIGH (ref 27–31)
MCHC RBC-ENTMCNC: 32.6 G/DL — SIGNIFICANT CHANGE UP (ref 32–37)
MCV RBC AUTO: 95.6 FL — SIGNIFICANT CHANGE UP (ref 81–99)
NRBC # BLD: 0 /100 WBCS — SIGNIFICANT CHANGE UP (ref 0–0)
PHOSPHATE SERPL-MCNC: 2.6 MG/DL — SIGNIFICANT CHANGE UP (ref 2.1–4.9)
PLATELET # BLD AUTO: 282 K/UL — SIGNIFICANT CHANGE UP (ref 130–400)
POTASSIUM SERPL-MCNC: 4 MMOL/L — SIGNIFICANT CHANGE UP (ref 3.5–5)
POTASSIUM SERPL-SCNC: 4 MMOL/L — SIGNIFICANT CHANGE UP (ref 3.5–5)
RBC # BLD: 4.27 M/UL — SIGNIFICANT CHANGE UP (ref 4.2–5.4)
RBC # FLD: 13.3 % — SIGNIFICANT CHANGE UP (ref 11.5–14.5)
SODIUM SERPL-SCNC: 142 MMOL/L — SIGNIFICANT CHANGE UP (ref 135–146)
WBC # BLD: 8.49 K/UL — SIGNIFICANT CHANGE UP (ref 4.8–10.8)
WBC # FLD AUTO: 8.49 K/UL — SIGNIFICANT CHANGE UP (ref 4.8–10.8)

## 2022-01-18 PROCEDURE — 74019 RADEX ABDOMEN 2 VIEWS: CPT | Mod: 26

## 2022-01-18 PROCEDURE — 99231 SBSQ HOSP IP/OBS SF/LOW 25: CPT

## 2022-01-18 RX ORDER — AMLODIPINE BESYLATE 2.5 MG/1
1 TABLET ORAL
Qty: 0 | Refills: 0 | DISCHARGE
Start: 2022-01-18

## 2022-01-18 RX ADMIN — Medication 650 MILLIGRAM(S): at 03:28

## 2022-01-18 RX ADMIN — PANTOPRAZOLE SODIUM 40 MILLIGRAM(S): 20 TABLET, DELAYED RELEASE ORAL at 12:18

## 2022-01-18 RX ADMIN — Medication 650 MILLIGRAM(S): at 03:54

## 2022-01-18 RX ADMIN — AMLODIPINE BESYLATE 5 MILLIGRAM(S): 2.5 TABLET ORAL at 12:18

## 2022-01-18 NOTE — PROGRESS NOTE ADULT - SUBJECTIVE AND OBJECTIVE BOX
Progress Note: Surgery  Patient: CHRIS APODACA , 81y (1940)Female   MRN: [id number]  Location: [facility] 1E-ED Hold 008 1  Visit: 01-15-22 Inpatient  Date: 22 @ 08:59    Events over 24h: No acute event overnight. No new complaint. Pt is hemodynamically stable. NPOeMeds, NGT in place with minimal output overnight. Patient reports improvement in abdominal distention, no gas/bm    Vitals: T(F): 97 (22 @ 05:23), Max: 98 (01-15-22 @ 21:30)  HR: 82 (22 @ 05:23)  BP: 153/68 (22 @ 05:23) (116/64 - 153/68)  RR: 18 (22 @ 05:23)  SpO2: 98% (22 @ 05:23)    Diet: Diet, NPO:   Except Medications (22 @ 08:32)    IV Fluid: lactated ringers. 1000 milliLiter(s) (100 mL/Hr) IV Continuous <Continuous>      In:   01-15-22 @ 07:  -  22 @ 07:00  --------------------------------------------------------  IN: 0 mL    Out:   01-15-22 @ 07:  -  22 @ 07:00  --------------------------------------------------------  OUT:    Indwelling Catheter - Urethral (mL): 400 mL  Total OUT: 400 mL    Net:   01-15-22 @ 07:01  -  22 @ 07:00  --------------------------------------------------------  NET: -400 mL    Physical Examination:  General Appearance: NAD, alert and cooperative  Heart: S1 and S2. No murmurs. Rhythm is regular.  Lungs: Clear to auscultation BL without rales, rhonchi, wheezing, crackles or diminished breath sounds.  Abdomen: Soft, nondistended, nontender. No rigidity, guarding, or rebound tenderness.    Medications: [Standing]  amLODIPine   Tablet 5 milliGRAM(s) Oral every 24 hours  budesonide 160 MICROgram(s)/formoterol 4.5 MICROgram(s) Inhaler 2 Puff(s) Inhalation two times a day  diatrizoate meglumine/diatrizoate sodium. 30 milliLiter(s) Oral once  heparin   Injectable 5000 Unit(s) SubCutaneous every 8 hours  lactated ringers. 1000 milliLiter(s) (100 mL/Hr) IV Continuous <Continuous>  pantoprazole  Injectable 40 milliGRAM(s) IV Push daily    Medications:[PRN]  acetaminophen     Tablet .. 650 milliGRAM(s) Oral every 6 hours PRN  morphine  - Injectable 2 milliGRAM(s) IV Push every 6 hours PRN    Labs:                        13.6   9.92  )-----------( 228      ( 2022 06:15 )             42.5   01-16    138  |  102  |  31<H>  ----------------------------<  109<H>  4.9   |  22  |  1.1    Ca    9.2      2022 06:15    TPro  7.0  /  Alb  4.3  /  TBili  0.4  /  DBili  x   /  AST  24  /  ALT  16  /  AlkPhos  69  01-16  LIVER FUNCTIONS - ( 2022 06:15 )  Alb: 4.3 g/dL / Pro: 7.0 g/dL / ALK PHOS: 69 U/L / ALT: 16 U/L / AST: 24 U/L / GGT: x         CARDIAC MARKERS ( 15 Kamlesh 2022 18:00 )  x     / <0.01 ng/mL / x     / x     / x        Micro/Urine:  Urinalysis Basic - ( 15 Kamlesh 2022 22:00 )    Color: Yellow / Appearance: Clear / S.010 / pH: x  Gluc: x / Ketone: 15  / Bili: Negative / Urobili: 1.0 mg/dL   Blood: x / Protein: 30 mg/dL / Nitrite: Negative   Leuk Esterase: Negative / RBC: x / WBC x   Sq Epi: x / Non Sq Epi: Occasional /HPF / Bacteria: Few    Imaging:  None/24h    
S; Pt doing well - denies abdominal pain; passed some flatus last night and this AM. Denies N/V/BM - feels like her abdomen is "back to normal". Voiding well  O; Vital Signs Last 24 Hrs  T(C): 37.1 (22 @ 05:00), Max: 37.1 (22 @ 05:00)  T(F): 98.7 (22 @ 05:00), Max: 98.7 (22 @ 05:00)  HR: 79 (22 @ 05:00) (78 - 89)  BP: 156/70 (22 @ 05:00) (128/59 - 156/70)  BP(mean): 85 (22 @ 18:30) (85 - 85)  RR: 18 (22 @ 05:00) (16 - 18)  SpO2: 95% (22 @ 19:00) (85% - 95%)    I&O's Detail    2022 07:01  -  2022 07:00  --------------------------------------------------------  IN:  Total IN: 0 mL    OUT:    Nasogastric/Oral tube (mL): 600 mL  Total OUT: 600 mL    Total NET: -600 mL      EXAM:  lungs: CTA  cvs: s1s2  abd: soft, +min BS, NT/ND  ext: no LE edema appreciated    Labs: PENDING TODAY                          13.6   9.92  )-----------( 228      ( 2022 06:15 )             42.5         -    138  |  102  |  31<H>  ----------------------------<  109<H>  4.9   |  22  |  1.1          LFTs:             7.0  | 0.4  | 24       ------------------[69      ( 2022 06:15 )  4.3  | x    | 16          Lipase:x      Amylase:x         Lactate, Blood: 0.9 mmol/L (01-15-22 @ 18:00)      Coags:    CARDIAC MARKERS ( 15 Kamlesh 2022 18:00 )  x     / <0.01 ng/mL / x     / x     / x          Urinalysis Basic - ( 15 Kamlesh 2022 22:00 )    Color: Yellow / Appearance: Clear / S.010 / pH: x  Gluc: x / Ketone: 15  / Bili: Negative / Urobili: 1.0 mg/dL   Blood: x / Protein: 30 mg/dL / Nitrite: Negative   Leuk Esterase: Negative / RBC: x / WBC x   Sq Epi: x / Non Sq Epi: Occasional /HPF / Bacteria: Few        Culture - Urine (collected 15 Kamlesh 2022 22:00)  Source: Clean Catch Clean Catch (Midstream)  Final Report (2022 07:33):    No growth    RADIOLOGY:  < from: Xray Chest 1 View- PORTABLE-Urgent (Xray Chest 1 View- PORTABLE-Urgent .) (22 @ 19:47) >  Findings:    Support devices: NG tube in stomach    Cardiac/mediastinum/hilum: Unremarkable.    Lung parenchyma/Pleura: Pulmonary vascular congestion. No pleural   effusion parenchymal opacity or air leak    Skeleton/soft tissues: Unremarkable.    Impression:    Pulmonary vascular congestion. No pleural effusion parenchymal opacity or   air leak    < end of copied text >    
Subjective: 82 yo female with no previous abdominal surgeries admitted for SBO. Pt is doing well. Tolerating clears. No n/v, f/c. +BM this AM - soft. Pt is hungry and looking forward to eating solid food.            Vital Signs Last 24 Hrs  T(C): 36 (18 Jan 2022 05:00), Max: 37.4 (17 Jan 2022 14:33)  T(F): 96.8 (18 Jan 2022 05:00), Max: 99.4 (17 Jan 2022 14:33)  HR: 73 (18 Jan 2022 05:00) (73 - 88)  BP: 144/63 (18 Jan 2022 05:00) (135/63 - 157/98)  BP(mean): --  RR: 16 (18 Jan 2022 05:00) (16 - 18)  SpO2: --    General Appearance: Appears well, NAD  Neck: Supple  Chest: Equal expansion bilaterally, equal breath sounds  CV: Pulse regular presently  Abdomen: Soft, NT/ND,+mild bs,  no rebound/gaurding  Extremities: Grossly symmetric, no calf tend b/l        I&O's Summary    I&O's Detail      MEDICATIONS  (STANDING):  amLODIPine   Tablet 5 milliGRAM(s) Oral every 24 hours  budesonide 160 MICROgram(s)/formoterol 4.5 MICROgram(s) Inhaler 2 Puff(s) Inhalation two times a day  heparin   Injectable 5000 Unit(s) SubCutaneous every 8 hours  lactated ringers. 1000 milliLiter(s) (40 mL/Hr) IV Continuous <Continuous>  pantoprazole  Injectable 40 milliGRAM(s) IV Push daily    MEDICATIONS  (PRN):  acetaminophen     Tablet .. 650 milliGRAM(s) Oral every 6 hours PRN Temp greater or equal to 38C (100.4F), Mild Pain (1 - 3)  lidocaine 2% Viscous 5 milliLiter(s) Swish and Spit every 2 hours PRN mouth pain  morphine  - Injectable 2 milliGRAM(s) IV Push every 6 hours PRN Moderate Pain (4 - 6)      LABS:                        13.3   8.49  )-----------( 282      ( 18 Jan 2022 07:24 )             40.8     01-17    138  |  99  |  22<H>  ----------------------------<  63<L>  4.2   |  25  |  1.0    Ca    9.1      17 Jan 2022 07:01  Phos  3.1     01-17  Mg     1.8     01-17            RADIOLOGY & ADDITIONAL STUDIES:  Obstruct Series this AM ++contrast in colon  await official read

## 2022-01-18 NOTE — PROGRESS NOTE ADULT - ATTENDING COMMENTS
Patient is a 81F with PMH of HTN, HLD, COPD who presents with abdominal pain found to have SBO on CTAP.  Reports BM today and last colonoscopy >10 years ago    Patient seen and examined.  Patient reports flatus    Abdomen - soft, mildly distended, minimally tender.    Vitals labs and images reviewed    CTAP Findings compatible with a small bowel obstruction and apparent transition point in the mid lower abdomen (4/201-194). No free air or ascites.  Moderate to severe bilateral centrilobular emphysema.  Innumerable hepatic hypodensities, too small to further characterize. Differential includes biliary hamartomas, infectious process and metastatic disease.    Plan  - NPO  - IVF  - NGT  - obstructive series - if contrast moves beyond the obstruction will DC NGT and start CLD  - OOB ambulate  - f/u bowel function
Patient is a 81F with PMH of HTN, HLD, COPD who presents with abdominal pain found to have SBO on CTAP.  Reports BM today and last colonoscopy >10 years ago    Patient seen and examined.  Patient reports flatus and BM.  Tolerating Clear liquids     Abdomen - soft, mildly distended, minimally tender.    Vitals labs and images reviewed    KUB There is a nonobstructive bowel gas pattern. Oral contrast is seen to the level of the rectum.    CTAP Findings compatible with a small bowel obstruction and apparent transition point in the mid lower abdomen (4/201-194). No free air or ascites.  Moderate to severe bilateral centrilobular emphysema.  Innumerable hepatic hypodensities, too small to further characterize. Differential includes biliary hamartomas, infectious process and metastatic disease.    Plan  - advance to low fiber diet  - IVF  - possible DC home today
Patient is a 81F with PMH of HTN, HLD, COPD who presents with abdominal pain found to have SBO on CTAP.  Reports BM today and last colonoscopy >10 years ago    Patient seen and examined.  Patient reports flatus    Abdomen - soft, mildly distended, minimally tender.    Vitals labs and images reviewed    CTAP Findings compatible with a small bowel obstruction and apparent transition point in the mid lower abdomen (4/201-194). No free air or ascites.  Moderate to severe bilateral centrilobular emphysema.  Innumerable hepatic hypodensities, too small to further characterize. Differential includes biliary hamartomas, infectious process and metastatic disease.    Plan  - NPO  - IVF  - NGT  - OOB ambulate  - f/u bowel function  - I had a long conversation with the patient and her family regarding her care.  We discussed a course of conservative management with NGT followe by surgery if she fails to resolve. They expressed understanding.

## 2022-01-18 NOTE — DISCHARGE NOTE PROVIDER - NSDCFUSCHEDAPPT_GEN_ALL_CORE_FT
CHRIS APODACA ; 01/24/2022 ; [facility] CHRIS Giraldo ; 02/24/2022 ; [facility] [address]

## 2022-01-18 NOTE — DISCHARGE NOTE NURSING/CASE MANAGEMENT/SOCIAL WORK - NSDCPEFALRISK_GEN_ALL_CORE
For information on Fall & Injury Prevention, visit: [email or web address] OR  [email or web address] OR  https://www.cdc.gov/steadi/patient.html

## 2022-01-18 NOTE — DISCHARGE NOTE PROVIDER - PROVIDER TOKENS
FREE:[LAST:[GI],PHONE:[(   )    -],FAX:[(   )    -],FOLLOWUP:[1 week]],FREE:[LAST:[PMD],PHONE:[(   )    -],FAX:[(   )    -],FOLLOWUP:[1 week]]

## 2022-01-18 NOTE — DISCHARGE NOTE PROVIDER - HOSPITAL COURSE
82 yo female, pmh of htn, hld, copd,  psh   denies  presents to ed for epigastric pain, x 2 days, mild, aching, no radiation, a.w nausea. denies fever, chills, cp, sob, v/d, dysuria.last  bm today- regular, last colonoscopy  >10  yr  ago.     pt found to have SBO and NGT was placed. Pt began having flatus and NGT was DC'd 82 yo female, pmh of htn, hld, copd,  psh   denies  presents to ed for epigastric pain, x 2 days, mild, aching, no radiation, a.w nausea. denies fever, chills, cp, sob, v/d, dysuria.last  bm today- regular, last colonoscopy  >10  yr  ago.     pt found to have SBO and NGT was placed. Pt began having flatus and NGT was DC'd. Pt was started on clears and tolerated. Pt had BM today and was advanced to soft, low residue diet. She tolerated that well and was stable for DC.    Pt will follow up with GI for colonoscopy/EGD and liver MRI

## 2022-01-18 NOTE — PROGRESS NOTE ADULT - ASSESSMENT
82 yo female with resolved SBO            Plan:  1. resolved SBO  - advance diet to soft  - if tolerates will DC home for FUP OP GI  - fup official read on xray        All above D/W Dr Weaver and surgical team out of season (available sept 1 thru apr 2 only)

## 2022-01-18 NOTE — DISCHARGE NOTE PROVIDER - CARE PROVIDER_API CALL
GI,   Phone: (   )    -  Fax: (   )    -  Follow Up Time: 1 week    PMD,   Phone: (   )    -  Fax: (   )    -  Follow Up Time: 1 week

## 2022-01-18 NOTE — DISCHARGE NOTE PROVIDER - NSDCCPCAREPLAN_GEN_ALL_CORE_FT
PRINCIPAL DISCHARGE DIAGNOSIS  Diagnosis: SBO (small bowel obstruction)  Assessment and Plan of Treatment: resolved  FUP GI Dr Chavira for colon/EGD      SECONDARY DISCHARGE DIAGNOSES  Diagnosis: Liver cyst  Assessment and Plan of Treatment: FUP MRI  FUP GI

## 2022-01-18 NOTE — DISCHARGE NOTE NURSING/CASE MANAGEMENT/SOCIAL WORK - PATIENT PORTAL LINK FT
You can access the FollowMyHealth Patient Portal offered by [facility] by registering at the following website: [email or web address]. By joining [facility]’s FollowMyHealth portal, you will also be able to view your health information using other applications (apps) compatible with our system.

## 2022-01-24 ENCOUNTER — APPOINTMENT (OUTPATIENT)
Dept: OBGYN | Facility: CLINIC | Age: 82
End: 2022-01-24
Payer: MEDICARE

## 2022-01-24 VITALS — WEIGHT: 132 LBS | BODY MASS INDEX: 24.94 KG/M2 | DIASTOLIC BLOOD PRESSURE: 82 MMHG | SYSTOLIC BLOOD PRESSURE: 130 MMHG

## 2022-01-24 VITALS — HEIGHT: 61 IN

## 2022-01-24 DIAGNOSIS — K56.600 PARTIAL INTESTINAL OBSTRUCTION, UNSPECIFIED AS TO CAUSE: ICD-10-CM

## 2022-01-24 DIAGNOSIS — N90.89 OTHER SPECIFIED NONINFLAMMATORY DISORDERS OF VULVA AND PERINEUM: ICD-10-CM

## 2022-01-24 DIAGNOSIS — N90.4 LEUKOPLAKIA OF VULVA: ICD-10-CM

## 2022-01-24 DIAGNOSIS — L29.2 PRURITUS VULVAE: ICD-10-CM

## 2022-01-24 DIAGNOSIS — N95.2 POSTMENOPAUSAL ATROPHIC VAGINITIS: ICD-10-CM

## 2022-01-24 PROCEDURE — 99213 OFFICE O/P EST LOW 20 MIN: CPT

## 2022-01-24 RX ORDER — CLOBETASOL PROPIONATE 0.5 MG/G
0.05 CREAM TOPICAL
Qty: 1 | Refills: 2 | Status: ACTIVE | COMMUNITY
Start: 2021-10-18 | End: 1900-01-01

## 2022-01-27 DIAGNOSIS — K21.9 GASTRO-ESOPHAGEAL REFLUX DISEASE WITHOUT ESOPHAGITIS: ICD-10-CM

## 2022-01-27 DIAGNOSIS — K56.609 UNSPECIFIED INTESTINAL OBSTRUCTION, UNSPECIFIED AS TO PARTIAL VERSUS COMPLETE OBSTRUCTION: ICD-10-CM

## 2022-01-27 DIAGNOSIS — I10 ESSENTIAL (PRIMARY) HYPERTENSION: ICD-10-CM

## 2022-01-27 DIAGNOSIS — E78.00 PURE HYPERCHOLESTEROLEMIA, UNSPECIFIED: ICD-10-CM

## 2022-01-27 DIAGNOSIS — K76.89 OTHER SPECIFIED DISEASES OF LIVER: ICD-10-CM

## 2022-01-27 DIAGNOSIS — J44.9 CHRONIC OBSTRUCTIVE PULMONARY DISEASE, UNSPECIFIED: ICD-10-CM

## 2022-02-18 ENCOUNTER — RX RENEWAL (OUTPATIENT)
Age: 82
End: 2022-02-18

## 2022-02-24 ENCOUNTER — APPOINTMENT (OUTPATIENT)
Age: 82
End: 2022-02-24
Payer: MEDICARE

## 2022-02-24 VITALS
DIASTOLIC BLOOD PRESSURE: 84 MMHG | SYSTOLIC BLOOD PRESSURE: 132 MMHG | BODY MASS INDEX: 25.11 KG/M2 | RESPIRATION RATE: 14 BRPM | WEIGHT: 133 LBS | OXYGEN SATURATION: 96 % | HEART RATE: 71 BPM | HEIGHT: 61 IN

## 2022-02-24 PROBLEM — I10 ESSENTIAL (PRIMARY) HYPERTENSION: Chronic | Status: ACTIVE | Noted: 2022-01-15

## 2022-02-24 PROBLEM — J44.9 CHRONIC OBSTRUCTIVE PULMONARY DISEASE, UNSPECIFIED: Chronic | Status: ACTIVE | Noted: 2022-01-15

## 2022-02-24 PROBLEM — E78.00 PURE HYPERCHOLESTEROLEMIA, UNSPECIFIED: Chronic | Status: ACTIVE | Noted: 2022-01-15

## 2022-02-24 PROCEDURE — 99213 OFFICE O/P EST LOW 20 MIN: CPT

## 2022-02-24 NOTE — ASSESSMENT
[FreeTextEntry1] : Moderate COPD \par Significant smoking history quit 13 years ago \par Being treated for GERD

## 2022-08-29 ENCOUNTER — APPOINTMENT (OUTPATIENT)
Age: 82
End: 2022-08-29

## 2022-08-29 VITALS
SYSTOLIC BLOOD PRESSURE: 120 MMHG | OXYGEN SATURATION: 98 % | DIASTOLIC BLOOD PRESSURE: 78 MMHG | HEIGHT: 61 IN | BODY MASS INDEX: 25.3 KG/M2 | WEIGHT: 134 LBS | RESPIRATION RATE: 14 BRPM | HEART RATE: 69 BPM

## 2022-08-29 PROCEDURE — 99214 OFFICE O/P EST MOD 30 MIN: CPT

## 2022-08-29 NOTE — ASSESSMENT
[FreeTextEntry1] : Moderate COPD well compensated \par Significant smoking history quit 13 years ago \par New 6 mm lung nodules to be followed \par Being treated for GERD

## 2022-11-07 ENCOUNTER — RX RENEWAL (OUTPATIENT)
Age: 82
End: 2022-11-07

## 2022-11-28 ENCOUNTER — APPOINTMENT (OUTPATIENT)
Age: 82
End: 2022-11-28

## 2022-11-28 VITALS
HEART RATE: 83 BPM | BODY MASS INDEX: 25.11 KG/M2 | OXYGEN SATURATION: 96 % | DIASTOLIC BLOOD PRESSURE: 92 MMHG | HEIGHT: 61 IN | RESPIRATION RATE: 14 BRPM | SYSTOLIC BLOOD PRESSURE: 138 MMHG | WEIGHT: 133 LBS

## 2022-11-28 PROCEDURE — 99214 OFFICE O/P EST MOD 30 MIN: CPT

## 2022-11-28 NOTE — ASSESSMENT
[FreeTextEntry1] : Moderate COPD well compensated \par Significant smoking history quit 13 years ago \par New 6 mm lung nodules to be followed.  INsurance denied repeat CT at 3 months \par Being treated for GERD

## 2022-11-28 NOTE — HISTORY OF PRESENT ILLNESS
[Doing Well] : doing well [None] : The patient is not currently on any medications for ~his/her~ COPD [Adherent] : the patient is adherent with ~his/her~ medication regimen [Goals--Doing Well] : the patient is doing well with ~his/her~ COPD goals [PFTs] : pulmonary function tests [Initial Evaluation] : an initial evaluation of [Currently Experiencing] : The patient is currently experiencing symptoms. [Cough] : no cough [Non-Productive Cough] : no non-productive cough

## 2023-02-18 ENCOUNTER — NON-APPOINTMENT (OUTPATIENT)
Age: 83
End: 2023-02-18

## 2023-03-27 ENCOUNTER — APPOINTMENT (OUTPATIENT)
Dept: PULMONOLOGY | Facility: CLINIC | Age: 83
End: 2023-03-27
Payer: MEDICARE

## 2023-03-27 VITALS
HEIGHT: 61 IN | DIASTOLIC BLOOD PRESSURE: 80 MMHG | WEIGHT: 132 LBS | SYSTOLIC BLOOD PRESSURE: 120 MMHG | HEART RATE: 117 BPM | BODY MASS INDEX: 24.92 KG/M2 | RESPIRATION RATE: 14 BRPM | OXYGEN SATURATION: 96 %

## 2023-03-27 PROCEDURE — 99214 OFFICE O/P EST MOD 30 MIN: CPT

## 2023-03-27 RX ORDER — TIOTROPIUM BROMIDE 18 UG/1
18 CAPSULE ORAL; RESPIRATORY (INHALATION) DAILY
Qty: 1 | Refills: 5 | Status: ACTIVE | COMMUNITY
Start: 2023-03-27 | End: 1900-01-01

## 2023-03-27 NOTE — ASSESSMENT
[FreeTextEntry1] : Moderate COPD well compensated on Breo \par Significant smoking history quit 13 years ago \par New 6 mm lung nodules improved.  New 7 mm lung nodules to be followed.   \par Being treated for GERD

## 2023-07-11 ENCOUNTER — APPOINTMENT (OUTPATIENT)
Dept: PULMONOLOGY | Facility: CLINIC | Age: 83
End: 2023-07-11
Payer: MEDICARE

## 2023-07-11 VITALS
HEART RATE: 67 BPM | HEIGHT: 61 IN | RESPIRATION RATE: 14 BRPM | BODY MASS INDEX: 24.35 KG/M2 | SYSTOLIC BLOOD PRESSURE: 116 MMHG | OXYGEN SATURATION: 97 % | DIASTOLIC BLOOD PRESSURE: 80 MMHG | WEIGHT: 129 LBS

## 2023-07-11 PROCEDURE — 99214 OFFICE O/P EST MOD 30 MIN: CPT

## 2023-07-11 NOTE — ASSESSMENT
[FreeTextEntry1] : Moderate COPD well compensated on Breo \par Significant smoking history quit 13 years ago \par New 6 mm lung nodules improved.  New 7 mm lung nodules to be followed.   \par Patient did perform her CT and Now  \par Being treated for GERD

## 2023-07-11 NOTE — HISTORY OF PRESENT ILLNESS
[Doing Well] : doing well [None] : The patient is not currently on any medications for ~his/her~ COPD [Adherent] : the patient is adherent with ~his/her~ medication regimen [Goals--Doing Well] : the patient is doing well with ~his/her~ COPD goals [PFTs] : pulmonary function tests [Follow-Up - Routine Clinic] : a routine clinic follow-up of [Currently Experiencing] : The patient is currently experiencing symptoms. [Cough] : no cough [Non-Productive Cough] : no non-productive cough

## 2023-07-24 ENCOUNTER — OUTPATIENT (OUTPATIENT)
Dept: OUTPATIENT SERVICES | Facility: HOSPITAL | Age: 83
LOS: 1 days | End: 2023-07-24
Payer: MEDICARE

## 2023-07-24 DIAGNOSIS — Z12.31 ENCOUNTER FOR SCREENING MAMMOGRAM FOR MALIGNANT NEOPLASM OF BREAST: ICD-10-CM

## 2023-07-24 PROCEDURE — 77063 BREAST TOMOSYNTHESIS BI: CPT | Mod: 26

## 2023-07-24 PROCEDURE — 77067 SCR MAMMO BI INCL CAD: CPT | Mod: 26

## 2023-07-24 PROCEDURE — 77063 BREAST TOMOSYNTHESIS BI: CPT

## 2023-07-24 PROCEDURE — 77067 SCR MAMMO BI INCL CAD: CPT

## 2023-07-25 DIAGNOSIS — Z12.31 ENCOUNTER FOR SCREENING MAMMOGRAM FOR MALIGNANT NEOPLASM OF BREAST: ICD-10-CM

## 2023-08-01 ENCOUNTER — OUTPATIENT (OUTPATIENT)
Dept: OUTPATIENT SERVICES | Facility: HOSPITAL | Age: 83
LOS: 1 days | End: 2023-08-01
Payer: MEDICARE

## 2023-08-01 ENCOUNTER — RESULT REVIEW (OUTPATIENT)
Age: 83
End: 2023-08-01

## 2023-08-01 DIAGNOSIS — R91.1 SOLITARY PULMONARY NODULE: ICD-10-CM

## 2023-08-01 DIAGNOSIS — Z00.8 ENCOUNTER FOR OTHER GENERAL EXAMINATION: ICD-10-CM

## 2023-08-01 PROCEDURE — 71250 CT THORAX DX C-: CPT | Mod: 26

## 2023-08-01 PROCEDURE — 71250 CT THORAX DX C-: CPT

## 2023-08-02 DIAGNOSIS — R91.1 SOLITARY PULMONARY NODULE: ICD-10-CM

## 2023-08-08 RX ORDER — FLUTICASONE FUROATE AND VILANTEROL TRIFENATATE 100; 25 UG/1; UG/1
100-25 POWDER RESPIRATORY (INHALATION) DAILY
Qty: 3 | Refills: 3 | Status: ACTIVE | COMMUNITY
Start: 2021-07-29 | End: 1900-01-01

## 2023-09-05 ENCOUNTER — APPOINTMENT (OUTPATIENT)
Dept: ORTHOPEDIC SURGERY | Facility: CLINIC | Age: 83
End: 2023-09-05
Payer: MEDICARE

## 2023-09-05 DIAGNOSIS — M25.511 PAIN IN RIGHT SHOULDER: ICD-10-CM

## 2023-09-05 PROCEDURE — 20610 DRAIN/INJ JOINT/BURSA W/O US: CPT | Mod: RT

## 2023-09-05 PROCEDURE — 73030 X-RAY EXAM OF SHOULDER: CPT | Mod: RT

## 2023-09-05 NOTE — DATA REVIEWED
[FreeTextEntry1] : X-ray images were obtained at the office today.  AP internal, AP external, Y view of the right shoulder reveals no acute fractures, dislocations, bony abnormalities.  Mild degenerative changes noted of the GH and AC joint

## 2023-09-05 NOTE — PHYSICAL EXAM
[de-identified] : Physical exam of the right shoulder: -No erythema, edema, ecchymosis or acute deformities.  Skin intact -no TTP of GH joint -Patient has full range of motion of the shoulder with no pain -+2 radial pulse, sensation intact to light touch

## 2023-09-05 NOTE — DISCUSSION/SUMMARY
[de-identified] : Patient has right shoulder pain.  Today, cortisone injection was inserted into the subacromial aspect of the right shoulder using sterile technique with lidocaine 1% 3 cc and dexamethasone 5 cc 5 mg/mL.  Patient tolerated this procedure well.  Risk benefits were discussed with patient prior to her verbal consent.  Patient was encouraged to ice the shoulder for the next few days, then transition to heat.  I also giving patient a prescription for naproxen 500 mg to take as needed for pain and inflammation.  Patient will follow-up on as-needed basis.  Patient agrees to above plan all questions were answered today

## 2023-09-05 NOTE — HISTORY OF PRESENT ILLNESS
[de-identified] : 83-year-old female presents with right shoulder pain.  Patient has had this pain chronically for years, no inciting event or injury.  Patient received cortisone injection a few years ago for the pain, that relieved her symptoms.  Patient has not tried physical therapy.  Takes anti-inflammatories as needed.  Patient is right-hand dominant

## 2023-10-31 ENCOUNTER — APPOINTMENT (OUTPATIENT)
Dept: RADIOLOGY | Facility: CLINIC | Age: 83
End: 2023-10-31
Payer: MEDICARE

## 2023-10-31 ENCOUNTER — APPOINTMENT (OUTPATIENT)
Dept: PAIN MANAGEMENT | Facility: CLINIC | Age: 83
End: 2023-10-31
Payer: MEDICARE

## 2023-10-31 VITALS
DIASTOLIC BLOOD PRESSURE: 84 MMHG | SYSTOLIC BLOOD PRESSURE: 135 MMHG | WEIGHT: 129 LBS | HEART RATE: 86 BPM | HEIGHT: 61 IN | BODY MASS INDEX: 24.35 KG/M2

## 2023-10-31 DIAGNOSIS — Z86.79 PERSONAL HISTORY OF OTHER DISEASES OF THE CIRCULATORY SYSTEM: ICD-10-CM

## 2023-10-31 DIAGNOSIS — Z86.39 PERSONAL HISTORY OF OTHER ENDOCRINE, NUTRITIONAL AND METABOLIC DISEASE: ICD-10-CM

## 2023-10-31 PROCEDURE — 73502 X-RAY EXAM HIP UNI 2-3 VIEWS: CPT

## 2023-10-31 PROCEDURE — 72100 X-RAY EXAM L-S SPINE 2/3 VWS: CPT

## 2023-10-31 PROCEDURE — 99204 OFFICE O/P NEW MOD 45 MIN: CPT

## 2023-11-04 ENCOUNTER — EMERGENCY (EMERGENCY)
Facility: HOSPITAL | Age: 83
LOS: 0 days | Discharge: HOME | End: 2023-11-04
Attending: EMERGENCY MEDICINE
Payer: MEDICARE

## 2023-11-04 VITALS
RESPIRATION RATE: 18 BRPM | WEIGHT: 130.07 LBS | SYSTOLIC BLOOD PRESSURE: 204 MMHG | TEMPERATURE: 97 F | DIASTOLIC BLOOD PRESSURE: 83 MMHG | HEART RATE: 68 BPM | OXYGEN SATURATION: 99 %

## 2023-11-04 VITALS
SYSTOLIC BLOOD PRESSURE: 192 MMHG | OXYGEN SATURATION: 95 % | DIASTOLIC BLOOD PRESSURE: 62 MMHG | HEART RATE: 80 BPM | RESPIRATION RATE: 20 BRPM

## 2023-11-04 DIAGNOSIS — E78.00 PURE HYPERCHOLESTEROLEMIA, UNSPECIFIED: ICD-10-CM
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
## 2023-11-04 DIAGNOSIS — I10 ESSENTIAL (PRIMARY) HYPERTENSION: ICD-10-CM

## 2023-11-04 DIAGNOSIS — J44.9 CHRONIC OBSTRUCTIVE PULMONARY DISEASE, UNSPECIFIED: ICD-10-CM
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
## 2023-11-04 DIAGNOSIS — M54.50 LOW BACK PAIN, UNSPECIFIED: ICD-10-CM

## 2023-11-04 DIAGNOSIS — S32.10XA UNSPECIFIED FRACTURE OF SACRUM, INITIAL ENCOUNTER FOR CLOSED FRACTURE: ICD-10-CM

## 2023-11-04 LAB
ALBUMIN SERPL ELPH-MCNC: 3.9 G/DL — SIGNIFICANT CHANGE UP (ref 3.5–5.2)
ALBUMIN SERPL ELPH-MCNC: 3.9 G/DL — SIGNIFICANT CHANGE UP (ref 3.5–5.2)
ALP SERPL-CCNC: 80 U/L — SIGNIFICANT CHANGE UP (ref 30–115)
ALP SERPL-CCNC: 80 U/L — SIGNIFICANT CHANGE UP (ref 30–115)
ALT FLD-CCNC: 16 U/L — SIGNIFICANT CHANGE UP (ref 0–41)
ALT FLD-CCNC: 16 U/L — SIGNIFICANT CHANGE UP (ref 0–41)
ANION GAP SERPL CALC-SCNC: 13 MMOL/L — SIGNIFICANT CHANGE UP (ref 7–14)
ANION GAP SERPL CALC-SCNC: 13 MMOL/L — SIGNIFICANT CHANGE UP (ref 7–14)
APPEARANCE UR: CLEAR — SIGNIFICANT CHANGE UP
APPEARANCE UR: CLEAR — SIGNIFICANT CHANGE UP
AST SERPL-CCNC: 23 U/L — SIGNIFICANT CHANGE UP (ref 0–41)
AST SERPL-CCNC: 23 U/L — SIGNIFICANT CHANGE UP (ref 0–41)
BACTERIA # UR AUTO: ABNORMAL /HPF
BACTERIA # UR AUTO: ABNORMAL /HPF
BASOPHILS # BLD AUTO: 0.05 K/UL — SIGNIFICANT CHANGE UP (ref 0–0.2)
BASOPHILS # BLD AUTO: 0.05 K/UL — SIGNIFICANT CHANGE UP (ref 0–0.2)
BASOPHILS NFR BLD AUTO: 0.4 % — SIGNIFICANT CHANGE UP (ref 0–1)
BASOPHILS NFR BLD AUTO: 0.4 % — SIGNIFICANT CHANGE UP (ref 0–1)
BILIRUB SERPL-MCNC: 0.5 MG/DL — SIGNIFICANT CHANGE UP (ref 0.2–1.2)
BILIRUB SERPL-MCNC: 0.5 MG/DL — SIGNIFICANT CHANGE UP (ref 0.2–1.2)
BILIRUB UR-MCNC: NEGATIVE — SIGNIFICANT CHANGE UP
BILIRUB UR-MCNC: NEGATIVE — SIGNIFICANT CHANGE UP
BUN SERPL-MCNC: 29 MG/DL — HIGH (ref 10–20)
BUN SERPL-MCNC: 29 MG/DL — HIGH (ref 10–20)
CALCIUM SERPL-MCNC: 8.8 MG/DL — SIGNIFICANT CHANGE UP (ref 8.4–10.5)
CALCIUM SERPL-MCNC: 8.8 MG/DL — SIGNIFICANT CHANGE UP (ref 8.4–10.5)
CHLORIDE SERPL-SCNC: 99 MMOL/L — SIGNIFICANT CHANGE UP (ref 98–110)
CHLORIDE SERPL-SCNC: 99 MMOL/L — SIGNIFICANT CHANGE UP (ref 98–110)
CO2 SERPL-SCNC: 22 MMOL/L — SIGNIFICANT CHANGE UP (ref 17–32)
CO2 SERPL-SCNC: 22 MMOL/L — SIGNIFICANT CHANGE UP (ref 17–32)
COLOR SPEC: YELLOW — SIGNIFICANT CHANGE UP
COLOR SPEC: YELLOW — SIGNIFICANT CHANGE UP
CREAT SERPL-MCNC: 1.4 MG/DL — SIGNIFICANT CHANGE UP (ref 0.7–1.5)
CREAT SERPL-MCNC: 1.4 MG/DL — SIGNIFICANT CHANGE UP (ref 0.7–1.5)
DIFF PNL FLD: NEGATIVE — SIGNIFICANT CHANGE UP
DIFF PNL FLD: NEGATIVE — SIGNIFICANT CHANGE UP
EGFR: 38 ML/MIN/1.73M2 — LOW
EGFR: 38 ML/MIN/1.73M2 — LOW
EOSINOPHIL # BLD AUTO: 0.11 K/UL — SIGNIFICANT CHANGE UP (ref 0–0.7)
EOSINOPHIL # BLD AUTO: 0.11 K/UL — SIGNIFICANT CHANGE UP (ref 0–0.7)
EOSINOPHIL NFR BLD AUTO: 0.9 % — SIGNIFICANT CHANGE UP (ref 0–8)
EOSINOPHIL NFR BLD AUTO: 0.9 % — SIGNIFICANT CHANGE UP (ref 0–8)
EPI CELLS # UR: PRESENT
EPI CELLS # UR: PRESENT
GLUCOSE SERPL-MCNC: 107 MG/DL — HIGH (ref 70–99)
GLUCOSE SERPL-MCNC: 107 MG/DL — HIGH (ref 70–99)
GLUCOSE UR QL: NEGATIVE MG/DL — SIGNIFICANT CHANGE UP
GLUCOSE UR QL: NEGATIVE MG/DL — SIGNIFICANT CHANGE UP
HCT VFR BLD CALC: 38.1 % — SIGNIFICANT CHANGE UP (ref 37–47)
HCT VFR BLD CALC: 38.1 % — SIGNIFICANT CHANGE UP (ref 37–47)
HGB BLD-MCNC: 12.7 G/DL — SIGNIFICANT CHANGE UP (ref 12–16)
HGB BLD-MCNC: 12.7 G/DL — SIGNIFICANT CHANGE UP (ref 12–16)
IMM GRANULOCYTES NFR BLD AUTO: 0.6 % — HIGH (ref 0.1–0.3)
IMM GRANULOCYTES NFR BLD AUTO: 0.6 % — HIGH (ref 0.1–0.3)
KETONES UR-MCNC: 15 MG/DL
KETONES UR-MCNC: 15 MG/DL
LACTATE SERPL-SCNC: 1.2 MMOL/L — SIGNIFICANT CHANGE UP (ref 0.7–2)
LACTATE SERPL-SCNC: 1.2 MMOL/L — SIGNIFICANT CHANGE UP (ref 0.7–2)
LEUKOCYTE ESTERASE UR-ACNC: NEGATIVE — SIGNIFICANT CHANGE UP
LEUKOCYTE ESTERASE UR-ACNC: NEGATIVE — SIGNIFICANT CHANGE UP
LYMPHOCYTES # BLD AUTO: 0.92 K/UL — LOW (ref 1.2–3.4)
LYMPHOCYTES # BLD AUTO: 0.92 K/UL — LOW (ref 1.2–3.4)
LYMPHOCYTES # BLD AUTO: 7.4 % — LOW (ref 20.5–51.1)
LYMPHOCYTES # BLD AUTO: 7.4 % — LOW (ref 20.5–51.1)
MCHC RBC-ENTMCNC: 32.1 PG — HIGH (ref 27–31)
MCHC RBC-ENTMCNC: 32.1 PG — HIGH (ref 27–31)
MCHC RBC-ENTMCNC: 33.3 G/DL — SIGNIFICANT CHANGE UP (ref 32–37)
MCHC RBC-ENTMCNC: 33.3 G/DL — SIGNIFICANT CHANGE UP (ref 32–37)
MCV RBC AUTO: 96.2 FL — SIGNIFICANT CHANGE UP (ref 81–99)
MCV RBC AUTO: 96.2 FL — SIGNIFICANT CHANGE UP (ref 81–99)
MONOCYTES # BLD AUTO: 1.17 K/UL — HIGH (ref 0.1–0.6)
MONOCYTES # BLD AUTO: 1.17 K/UL — HIGH (ref 0.1–0.6)
MONOCYTES NFR BLD AUTO: 9.5 % — HIGH (ref 1.7–9.3)
MONOCYTES NFR BLD AUTO: 9.5 % — HIGH (ref 1.7–9.3)
NEUTROPHILS # BLD AUTO: 10.04 K/UL — HIGH (ref 1.4–6.5)
NEUTROPHILS # BLD AUTO: 10.04 K/UL — HIGH (ref 1.4–6.5)
NEUTROPHILS NFR BLD AUTO: 81.2 % — HIGH (ref 42.2–75.2)
NEUTROPHILS NFR BLD AUTO: 81.2 % — HIGH (ref 42.2–75.2)
NITRITE UR-MCNC: NEGATIVE — SIGNIFICANT CHANGE UP
NITRITE UR-MCNC: NEGATIVE — SIGNIFICANT CHANGE UP
NRBC # BLD: 0 /100 WBCS — SIGNIFICANT CHANGE UP (ref 0–0)
NRBC # BLD: 0 /100 WBCS — SIGNIFICANT CHANGE UP (ref 0–0)
PH UR: 6.5 — SIGNIFICANT CHANGE UP (ref 5–8)
PH UR: 6.5 — SIGNIFICANT CHANGE UP (ref 5–8)
PLATELET # BLD AUTO: 269 K/UL — SIGNIFICANT CHANGE UP (ref 130–400)
PLATELET # BLD AUTO: 269 K/UL — SIGNIFICANT CHANGE UP (ref 130–400)
PMV BLD: 9.3 FL — SIGNIFICANT CHANGE UP (ref 7.4–10.4)
PMV BLD: 9.3 FL — SIGNIFICANT CHANGE UP (ref 7.4–10.4)
POTASSIUM SERPL-MCNC: 4.5 MMOL/L — SIGNIFICANT CHANGE UP (ref 3.5–5)
POTASSIUM SERPL-MCNC: 4.5 MMOL/L — SIGNIFICANT CHANGE UP (ref 3.5–5)
POTASSIUM SERPL-SCNC: 4.5 MMOL/L — SIGNIFICANT CHANGE UP (ref 3.5–5)
POTASSIUM SERPL-SCNC: 4.5 MMOL/L — SIGNIFICANT CHANGE UP (ref 3.5–5)
PROT SERPL-MCNC: 7 G/DL — SIGNIFICANT CHANGE UP (ref 6–8)
PROT SERPL-MCNC: 7 G/DL — SIGNIFICANT CHANGE UP (ref 6–8)
PROT UR-MCNC: 30 MG/DL
PROT UR-MCNC: 30 MG/DL
RBC # BLD: 3.96 M/UL — LOW (ref 4.2–5.4)
RBC # BLD: 3.96 M/UL — LOW (ref 4.2–5.4)
RBC # FLD: 12.5 % — SIGNIFICANT CHANGE UP (ref 11.5–14.5)
RBC # FLD: 12.5 % — SIGNIFICANT CHANGE UP (ref 11.5–14.5)
RBC CASTS # UR COMP ASSIST: 0 /HPF — SIGNIFICANT CHANGE UP (ref 0–4)
RBC CASTS # UR COMP ASSIST: 0 /HPF — SIGNIFICANT CHANGE UP (ref 0–4)
SODIUM SERPL-SCNC: 134 MMOL/L — LOW (ref 135–146)
SODIUM SERPL-SCNC: 134 MMOL/L — LOW (ref 135–146)
SP GR SPEC: >1.03 — HIGH (ref 1–1.03)
SP GR SPEC: >1.03 — HIGH (ref 1–1.03)
SQUAMOUS # UR AUTO: 3 /HPF — SIGNIFICANT CHANGE UP (ref 0–5)
SQUAMOUS # UR AUTO: 3 /HPF — SIGNIFICANT CHANGE UP (ref 0–5)
UROBILINOGEN FLD QL: 1 MG/DL — SIGNIFICANT CHANGE UP (ref 0.2–1)
UROBILINOGEN FLD QL: 1 MG/DL — SIGNIFICANT CHANGE UP (ref 0.2–1)
WBC # BLD: 12.36 K/UL — HIGH (ref 4.8–10.8)
WBC # BLD: 12.36 K/UL — HIGH (ref 4.8–10.8)
WBC # FLD AUTO: 12.36 K/UL — HIGH (ref 4.8–10.8)
WBC # FLD AUTO: 12.36 K/UL — HIGH (ref 4.8–10.8)
WBC UR QL: 10 /HPF — HIGH (ref 0–5)
WBC UR QL: 10 /HPF — HIGH (ref 0–5)

## 2023-11-04 PROCEDURE — 99285 EMERGENCY DEPT VISIT HI MDM: CPT

## 2023-11-04 PROCEDURE — 83605 ASSAY OF LACTIC ACID: CPT

## 2023-11-04 PROCEDURE — 99284 EMERGENCY DEPT VISIT MOD MDM: CPT | Mod: 25

## 2023-11-04 PROCEDURE — 96374 THER/PROPH/DIAG INJ IV PUSH: CPT | Mod: XU

## 2023-11-04 PROCEDURE — 80053 COMPREHEN METABOLIC PANEL: CPT

## 2023-11-04 PROCEDURE — 85025 COMPLETE CBC W/AUTO DIFF WBC: CPT

## 2023-11-04 PROCEDURE — 96376 TX/PRO/DX INJ SAME DRUG ADON: CPT

## 2023-11-04 PROCEDURE — 36415 COLL VENOUS BLD VENIPUNCTURE: CPT

## 2023-11-04 PROCEDURE — 81001 URINALYSIS AUTO W/SCOPE: CPT

## 2023-11-04 PROCEDURE — 74177 CT ABD & PELVIS W/CONTRAST: CPT | Mod: MA

## 2023-11-04 PROCEDURE — 96375 TX/PRO/DX INJ NEW DRUG ADDON: CPT

## 2023-11-04 PROCEDURE — 87086 URINE CULTURE/COLONY COUNT: CPT

## 2023-11-04 PROCEDURE — 74177 CT ABD & PELVIS W/CONTRAST: CPT | Mod: 26,MA

## 2023-11-04 RX ORDER — ROSUVASTATIN CALCIUM 5 MG/1
1 TABLET ORAL
Refills: 0 | DISCHARGE

## 2023-11-04 RX ORDER — ONDANSETRON 8 MG/1
4 TABLET, FILM COATED ORAL ONCE
Refills: 0 | Status: COMPLETED | OUTPATIENT
Start: 2023-11-04 | End: 2023-11-04

## 2023-11-04 RX ORDER — MORPHINE SULFATE 50 MG/1
6 CAPSULE, EXTENDED RELEASE ORAL ONCE
Refills: 0 | Status: DISCONTINUED | OUTPATIENT
Start: 2023-11-04 | End: 2023-11-04

## 2023-11-04 RX ORDER — TRAMADOL HYDROCHLORIDE 50 MG/1
1 TABLET ORAL
Qty: 8 | Refills: 0
Start: 2023-11-04 | End: 2023-11-05

## 2023-11-04 RX ADMIN — MORPHINE SULFATE 6 MILLIGRAM(S): 50 CAPSULE, EXTENDED RELEASE ORAL at 14:52

## 2023-11-04 RX ADMIN — MORPHINE SULFATE 6 MILLIGRAM(S): 50 CAPSULE, EXTENDED RELEASE ORAL at 16:00

## 2023-11-04 RX ADMIN — ONDANSETRON 4 MILLIGRAM(S): 8 TABLET, FILM COATED ORAL at 19:50

## 2023-11-04 RX ADMIN — ONDANSETRON 4 MILLIGRAM(S): 8 TABLET, FILM COATED ORAL at 17:50

## 2023-11-04 RX ADMIN — ONDANSETRON 4 MILLIGRAM(S): 8 TABLET, FILM COATED ORAL at 14:49

## 2023-11-04 RX ADMIN — MORPHINE SULFATE 6 MILLIGRAM(S): 50 CAPSULE, EXTENDED RELEASE ORAL at 18:52

## 2023-11-04 NOTE — ED PROVIDER NOTE - OBJECTIVE STATEMENT
Patient c/o worsening low back  pain radiating to left buttock for 1 week, Seen by ortho,  Xray neg, told to have MRI,  ginen Rx for an NSAID and muscle relaxant, Not helping , pain worsening,   denies any recent trauma or falling Patient c/o worsening low back  pain radiating to left buttock for 1 week, Seen by ortho,  Xray neg, told to have MRI,  ginen Rx for an NSAID and muscle relaxant, Not helping , pain worsening,   denies any loss of bladder or bowel control

## 2023-11-04 NOTE — ED PROVIDER NOTE - ATTENDING APP SHARED VISIT CONTRIBUTION OF CARE
I have personally performed a history and physical exam on this patient and personally directed the management of the patient.  Patient presents for evaluation of low left back and buttock pain low back  pain radiating to left buttock for 1 week, Seen by ortho,  Xray neg, told to have MRI,  ginen Rx for an NSAID and muscle relaxant, Not helping , pain worsening,   denies any loss of bladder or bowel contro no saddle anesthesia, no fevers or chills no vomiting.     on physical exam the patient is nc at perrla eomi oropharynx clear cta b/l, rrr s1s2 noted abd-soft no guarding no rebound ext from with no focal deficits no loss of bladder or bowel control no saddle anesthesia.  no numbness.      a/p- patient given po pain medication we obtained xrays as well as ct findings we discussed the findings of ct with patient offered admission for pt and rehab however, patient wants to be discharged we discussed indications to return

## 2023-11-04 NOTE — ED PROVIDER NOTE - PATIENT PORTAL LINK FT
You can access the FollowMyHealth Patient Portal offered by [facility] by registering at the following website: [email or web address]. By joining [employer]s FollowMyHealth portal, you will also be able to view your health information using other applications (apps) compatible with our system.

## 2023-11-04 NOTE — ED PROVIDER NOTE - NSFOLLOWUPINSTRUCTIONS_ED_ALL_ED_FT
rest, take meds as prescribed, follow up with your orthopedic MD and  Physical therapy rehab for treatment     Our Emergency Department Referral Coordinators will be reaching out to you in the next 24-48 hours from 9:00am to 5:00pm with a follow up appointment. Please expect a phone call from the hospital in that time frame. If you do not receive a call or if you have any questions or concerns, you can reach them at [phone] or [phone].

## 2023-11-04 NOTE — ED ADULT NURSE NOTE - NSFALLHARMRISKINTERV_ED_ALL_ED
Assistance OOB with selected safe patient handling equipment if applicable/Assistance with ambulation/Communicate risk of Fall with Harm to all staff, patient, and family/Monitor gait and stability/Provide visual cue: red socks, yellow wristband, yellow gown, etc/Reinforce activity limits and safety measures with patient and family/Bed in lowest position, wheels locked, appropriate side rails in place/Call bell, personal items and telephone in reach/Instruct patient to call for assistance before getting out of bed/chair/stretcher/Non-slip footwear applied when patient is off stretcher/[place] to call system/Physically safe environment - no spills, clutter or unnecessary equipment/Purposeful Proactive Rounding/Room/bathroom lighting operational, light cord in reach

## 2023-11-04 NOTE — ED PROVIDER NOTE - CLINICAL SUMMARY MEDICAL DECISION MAKING FREE TEXT BOX
patient given po pain medication we obtained xrays as well as ct findings we discussed the findings of ct with patient offered admission for pt and rehab however, patient wants to be discharged we discussed indications to return

## 2023-11-06 LAB
CULTURE RESULTS: SIGNIFICANT CHANGE UP
CULTURE RESULTS: SIGNIFICANT CHANGE UP
SPECIMEN SOURCE: SIGNIFICANT CHANGE UP
SPECIMEN SOURCE: SIGNIFICANT CHANGE UP

## 2023-11-28 ENCOUNTER — APPOINTMENT (OUTPATIENT)
Dept: PAIN MANAGEMENT | Facility: CLINIC | Age: 83
End: 2023-11-28

## 2024-01-08 ENCOUNTER — EMERGENCY (EMERGENCY)
Facility: HOSPITAL | Age: 84
LOS: 0 days | Discharge: HOME | End: 2024-01-08
Attending: EMERGENCY MEDICINE
Payer: MEDICARE

## 2024-01-08 VITALS
SYSTOLIC BLOOD PRESSURE: 142 MMHG | TEMPERATURE: 98 F | DIASTOLIC BLOOD PRESSURE: 77 MMHG | OXYGEN SATURATION: 100 % | WEIGHT: 130.07 LBS | RESPIRATION RATE: 18 BRPM | HEART RATE: 77 BPM

## 2024-01-08 DIAGNOSIS — R10.31 RIGHT LOWER QUADRANT PAIN: ICD-10-CM

## 2024-01-08 DIAGNOSIS — R14.3 FLATULENCE: ICD-10-CM

## 2024-01-08 DIAGNOSIS — J44.9 CHRONIC OBSTRUCTIVE PULMONARY DISEASE, UNSPECIFIED: ICD-10-CM

## 2024-01-08 DIAGNOSIS — I10 ESSENTIAL (PRIMARY) HYPERTENSION: ICD-10-CM

## 2024-01-08 DIAGNOSIS — G89.29 OTHER CHRONIC PAIN: ICD-10-CM

## 2024-01-08 DIAGNOSIS — E78.5 HYPERLIPIDEMIA, UNSPECIFIED: ICD-10-CM

## 2024-01-08 LAB
ALBUMIN SERPL ELPH-MCNC: 4 G/DL — SIGNIFICANT CHANGE UP (ref 3.5–5.2)
ALBUMIN SERPL ELPH-MCNC: 4 G/DL — SIGNIFICANT CHANGE UP (ref 3.5–5.2)
ALP SERPL-CCNC: 95 U/L — SIGNIFICANT CHANGE UP (ref 30–115)
ALP SERPL-CCNC: 95 U/L — SIGNIFICANT CHANGE UP (ref 30–115)
ALT FLD-CCNC: 14 U/L — SIGNIFICANT CHANGE UP (ref 0–41)
ALT FLD-CCNC: 14 U/L — SIGNIFICANT CHANGE UP (ref 0–41)
ANION GAP SERPL CALC-SCNC: 14 MMOL/L — SIGNIFICANT CHANGE UP (ref 7–14)
ANION GAP SERPL CALC-SCNC: 14 MMOL/L — SIGNIFICANT CHANGE UP (ref 7–14)
APPEARANCE UR: CLEAR — SIGNIFICANT CHANGE UP
APPEARANCE UR: CLEAR — SIGNIFICANT CHANGE UP
AST SERPL-CCNC: 21 U/L — SIGNIFICANT CHANGE UP (ref 0–41)
AST SERPL-CCNC: 21 U/L — SIGNIFICANT CHANGE UP (ref 0–41)
BASOPHILS # BLD AUTO: 0.04 K/UL — SIGNIFICANT CHANGE UP (ref 0–0.2)
BASOPHILS # BLD AUTO: 0.04 K/UL — SIGNIFICANT CHANGE UP (ref 0–0.2)
BASOPHILS NFR BLD AUTO: 0.6 % — SIGNIFICANT CHANGE UP (ref 0–1)
BASOPHILS NFR BLD AUTO: 0.6 % — SIGNIFICANT CHANGE UP (ref 0–1)
BILIRUB SERPL-MCNC: 0.4 MG/DL — SIGNIFICANT CHANGE UP (ref 0.2–1.2)
BILIRUB SERPL-MCNC: 0.4 MG/DL — SIGNIFICANT CHANGE UP (ref 0.2–1.2)
BILIRUB UR-MCNC: NEGATIVE — SIGNIFICANT CHANGE UP
BILIRUB UR-MCNC: NEGATIVE — SIGNIFICANT CHANGE UP
BUN SERPL-MCNC: 24 MG/DL — HIGH (ref 10–20)
BUN SERPL-MCNC: 24 MG/DL — HIGH (ref 10–20)
CALCIUM SERPL-MCNC: 9 MG/DL — SIGNIFICANT CHANGE UP (ref 8.4–10.5)
CALCIUM SERPL-MCNC: 9 MG/DL — SIGNIFICANT CHANGE UP (ref 8.4–10.5)
CHLORIDE SERPL-SCNC: 100 MMOL/L — SIGNIFICANT CHANGE UP (ref 98–110)
CHLORIDE SERPL-SCNC: 100 MMOL/L — SIGNIFICANT CHANGE UP (ref 98–110)
CO2 SERPL-SCNC: 22 MMOL/L — SIGNIFICANT CHANGE UP (ref 17–32)
CO2 SERPL-SCNC: 22 MMOL/L — SIGNIFICANT CHANGE UP (ref 17–32)
COLOR SPEC: YELLOW — SIGNIFICANT CHANGE UP
COLOR SPEC: YELLOW — SIGNIFICANT CHANGE UP
CREAT SERPL-MCNC: 1.2 MG/DL — SIGNIFICANT CHANGE UP (ref 0.7–1.5)
CREAT SERPL-MCNC: 1.2 MG/DL — SIGNIFICANT CHANGE UP (ref 0.7–1.5)
DIFF PNL FLD: NEGATIVE — SIGNIFICANT CHANGE UP
DIFF PNL FLD: NEGATIVE — SIGNIFICANT CHANGE UP
EGFR: 45 ML/MIN/1.73M2 — LOW
EGFR: 45 ML/MIN/1.73M2 — LOW
EOSINOPHIL # BLD AUTO: 0.08 K/UL — SIGNIFICANT CHANGE UP (ref 0–0.7)
EOSINOPHIL # BLD AUTO: 0.08 K/UL — SIGNIFICANT CHANGE UP (ref 0–0.7)
EOSINOPHIL NFR BLD AUTO: 1.1 % — SIGNIFICANT CHANGE UP (ref 0–8)
EOSINOPHIL NFR BLD AUTO: 1.1 % — SIGNIFICANT CHANGE UP (ref 0–8)
GLUCOSE SERPL-MCNC: 74 MG/DL — SIGNIFICANT CHANGE UP (ref 70–99)
GLUCOSE SERPL-MCNC: 74 MG/DL — SIGNIFICANT CHANGE UP (ref 70–99)
GLUCOSE UR QL: NEGATIVE MG/DL — SIGNIFICANT CHANGE UP
GLUCOSE UR QL: NEGATIVE MG/DL — SIGNIFICANT CHANGE UP
HCT VFR BLD CALC: 39.4 % — SIGNIFICANT CHANGE UP (ref 37–47)
HCT VFR BLD CALC: 39.4 % — SIGNIFICANT CHANGE UP (ref 37–47)
HGB BLD-MCNC: 12.7 G/DL — SIGNIFICANT CHANGE UP (ref 12–16)
HGB BLD-MCNC: 12.7 G/DL — SIGNIFICANT CHANGE UP (ref 12–16)
IMM GRANULOCYTES NFR BLD AUTO: 0.6 % — HIGH (ref 0.1–0.3)
IMM GRANULOCYTES NFR BLD AUTO: 0.6 % — HIGH (ref 0.1–0.3)
KETONES UR-MCNC: NEGATIVE MG/DL — SIGNIFICANT CHANGE UP
KETONES UR-MCNC: NEGATIVE MG/DL — SIGNIFICANT CHANGE UP
LACTATE SERPL-SCNC: 0.8 MMOL/L — SIGNIFICANT CHANGE UP (ref 0.7–2)
LACTATE SERPL-SCNC: 0.8 MMOL/L — SIGNIFICANT CHANGE UP (ref 0.7–2)
LEUKOCYTE ESTERASE UR-ACNC: ABNORMAL
LEUKOCYTE ESTERASE UR-ACNC: ABNORMAL
LIDOCAIN IGE QN: 33 U/L — SIGNIFICANT CHANGE UP (ref 7–60)
LIDOCAIN IGE QN: 33 U/L — SIGNIFICANT CHANGE UP (ref 7–60)
LYMPHOCYTES # BLD AUTO: 1.16 K/UL — LOW (ref 1.2–3.4)
LYMPHOCYTES # BLD AUTO: 1.16 K/UL — LOW (ref 1.2–3.4)
LYMPHOCYTES # BLD AUTO: 16.1 % — LOW (ref 20.5–51.1)
LYMPHOCYTES # BLD AUTO: 16.1 % — LOW (ref 20.5–51.1)
MCHC RBC-ENTMCNC: 31.3 PG — HIGH (ref 27–31)
MCHC RBC-ENTMCNC: 31.3 PG — HIGH (ref 27–31)
MCHC RBC-ENTMCNC: 32.2 G/DL — SIGNIFICANT CHANGE UP (ref 32–37)
MCHC RBC-ENTMCNC: 32.2 G/DL — SIGNIFICANT CHANGE UP (ref 32–37)
MCV RBC AUTO: 97 FL — SIGNIFICANT CHANGE UP (ref 81–99)
MCV RBC AUTO: 97 FL — SIGNIFICANT CHANGE UP (ref 81–99)
MONOCYTES # BLD AUTO: 0.9 K/UL — HIGH (ref 0.1–0.6)
MONOCYTES # BLD AUTO: 0.9 K/UL — HIGH (ref 0.1–0.6)
MONOCYTES NFR BLD AUTO: 12.5 % — HIGH (ref 1.7–9.3)
MONOCYTES NFR BLD AUTO: 12.5 % — HIGH (ref 1.7–9.3)
NEUTROPHILS # BLD AUTO: 4.98 K/UL — SIGNIFICANT CHANGE UP (ref 1.4–6.5)
NEUTROPHILS # BLD AUTO: 4.98 K/UL — SIGNIFICANT CHANGE UP (ref 1.4–6.5)
NEUTROPHILS NFR BLD AUTO: 69.1 % — SIGNIFICANT CHANGE UP (ref 42.2–75.2)
NEUTROPHILS NFR BLD AUTO: 69.1 % — SIGNIFICANT CHANGE UP (ref 42.2–75.2)
NITRITE UR-MCNC: NEGATIVE — SIGNIFICANT CHANGE UP
NITRITE UR-MCNC: NEGATIVE — SIGNIFICANT CHANGE UP
NRBC # BLD: 0 /100 WBCS — SIGNIFICANT CHANGE UP (ref 0–0)
NRBC # BLD: 0 /100 WBCS — SIGNIFICANT CHANGE UP (ref 0–0)
PH UR: 6 — SIGNIFICANT CHANGE UP (ref 5–8)
PH UR: 6 — SIGNIFICANT CHANGE UP (ref 5–8)
PLATELET # BLD AUTO: 311 K/UL — SIGNIFICANT CHANGE UP (ref 130–400)
PLATELET # BLD AUTO: 311 K/UL — SIGNIFICANT CHANGE UP (ref 130–400)
PMV BLD: 9.6 FL — SIGNIFICANT CHANGE UP (ref 7.4–10.4)
PMV BLD: 9.6 FL — SIGNIFICANT CHANGE UP (ref 7.4–10.4)
POTASSIUM SERPL-MCNC: 4.4 MMOL/L — SIGNIFICANT CHANGE UP (ref 3.5–5)
POTASSIUM SERPL-MCNC: 4.4 MMOL/L — SIGNIFICANT CHANGE UP (ref 3.5–5)
POTASSIUM SERPL-SCNC: 4.4 MMOL/L — SIGNIFICANT CHANGE UP (ref 3.5–5)
POTASSIUM SERPL-SCNC: 4.4 MMOL/L — SIGNIFICANT CHANGE UP (ref 3.5–5)
PROT SERPL-MCNC: 6.7 G/DL — SIGNIFICANT CHANGE UP (ref 6–8)
PROT SERPL-MCNC: 6.7 G/DL — SIGNIFICANT CHANGE UP (ref 6–8)
PROT UR-MCNC: NEGATIVE MG/DL — SIGNIFICANT CHANGE UP
PROT UR-MCNC: NEGATIVE MG/DL — SIGNIFICANT CHANGE UP
RBC # BLD: 4.06 M/UL — LOW (ref 4.2–5.4)
RBC # BLD: 4.06 M/UL — LOW (ref 4.2–5.4)
RBC # FLD: 13.6 % — SIGNIFICANT CHANGE UP (ref 11.5–14.5)
RBC # FLD: 13.6 % — SIGNIFICANT CHANGE UP (ref 11.5–14.5)
SODIUM SERPL-SCNC: 136 MMOL/L — SIGNIFICANT CHANGE UP (ref 135–146)
SODIUM SERPL-SCNC: 136 MMOL/L — SIGNIFICANT CHANGE UP (ref 135–146)
SP GR SPEC: 1.01 — SIGNIFICANT CHANGE UP (ref 1–1.03)
SP GR SPEC: 1.01 — SIGNIFICANT CHANGE UP (ref 1–1.03)
UROBILINOGEN FLD QL: 0.2 MG/DL — SIGNIFICANT CHANGE UP (ref 0.2–1)
UROBILINOGEN FLD QL: 0.2 MG/DL — SIGNIFICANT CHANGE UP (ref 0.2–1)
WBC # BLD: 7.2 K/UL — SIGNIFICANT CHANGE UP (ref 4.8–10.8)
WBC # BLD: 7.2 K/UL — SIGNIFICANT CHANGE UP (ref 4.8–10.8)
WBC # FLD AUTO: 7.2 K/UL — SIGNIFICANT CHANGE UP (ref 4.8–10.8)
WBC # FLD AUTO: 7.2 K/UL — SIGNIFICANT CHANGE UP (ref 4.8–10.8)

## 2024-01-08 PROCEDURE — 83605 ASSAY OF LACTIC ACID: CPT

## 2024-01-08 PROCEDURE — 74177 CT ABD & PELVIS W/CONTRAST: CPT | Mod: MA

## 2024-01-08 PROCEDURE — 96375 TX/PRO/DX INJ NEW DRUG ADDON: CPT

## 2024-01-08 PROCEDURE — 80053 COMPREHEN METABOLIC PANEL: CPT

## 2024-01-08 PROCEDURE — 36415 COLL VENOUS BLD VENIPUNCTURE: CPT

## 2024-01-08 PROCEDURE — 96374 THER/PROPH/DIAG INJ IV PUSH: CPT | Mod: XU

## 2024-01-08 PROCEDURE — 99285 EMERGENCY DEPT VISIT HI MDM: CPT

## 2024-01-08 PROCEDURE — 83690 ASSAY OF LIPASE: CPT

## 2024-01-08 PROCEDURE — 74177 CT ABD & PELVIS W/CONTRAST: CPT | Mod: 26,MA

## 2024-01-08 PROCEDURE — 87086 URINE CULTURE/COLONY COUNT: CPT

## 2024-01-08 PROCEDURE — 85025 COMPLETE CBC W/AUTO DIFF WBC: CPT

## 2024-01-08 PROCEDURE — 99284 EMERGENCY DEPT VISIT MOD MDM: CPT | Mod: 25

## 2024-01-08 PROCEDURE — 81001 URINALYSIS AUTO W/SCOPE: CPT

## 2024-01-08 RX ORDER — KETOROLAC TROMETHAMINE 30 MG/ML
30 SYRINGE (ML) INJECTION ONCE
Refills: 0 | Status: DISCONTINUED | OUTPATIENT
Start: 2024-01-08 | End: 2024-01-08

## 2024-01-08 RX ORDER — FAMOTIDINE 10 MG/ML
20 INJECTION INTRAVENOUS ONCE
Refills: 0 | Status: COMPLETED | OUTPATIENT
Start: 2024-01-08 | End: 2024-01-08

## 2024-01-08 RX ORDER — IOHEXOL 300 MG/ML
30 INJECTION, SOLUTION INTRAVENOUS ONCE
Refills: 0 | Status: COMPLETED | OUTPATIENT
Start: 2024-01-08 | End: 2024-01-08

## 2024-01-08 RX ORDER — SODIUM CHLORIDE 9 MG/ML
1000 INJECTION INTRAMUSCULAR; INTRAVENOUS; SUBCUTANEOUS ONCE
Refills: 0 | Status: COMPLETED | OUTPATIENT
Start: 2024-01-08 | End: 2024-01-08

## 2024-01-08 RX ADMIN — IOHEXOL 30 MILLILITER(S): 300 INJECTION, SOLUTION INTRAVENOUS at 14:34

## 2024-01-08 RX ADMIN — FAMOTIDINE 20 MILLIGRAM(S): 10 INJECTION INTRAVENOUS at 16:08

## 2024-01-08 RX ADMIN — SODIUM CHLORIDE 1000 MILLILITER(S): 9 INJECTION INTRAMUSCULAR; INTRAVENOUS; SUBCUTANEOUS at 14:34

## 2024-01-08 RX ADMIN — Medication 30 MILLIGRAM(S): at 16:05

## 2024-01-08 NOTE — ED PROVIDER NOTE - PHYSICAL EXAMINATION
VSS, awake, alert, non-toxic appearing, no scleral icterus, oropharynx clear, mmm, no jaundice, skin rash or lesions, chest CTAB, non-labored breathing, no w/r/r, +S1/S2, RRR, no m/r/g, abdomen soft, mild-mod ttp rlq w/o peritoneal signs, +BS, no hernias or distention, no pulsatile masses or bruits appreciated, no CVA tenderness, no peripheral edema or deformities, alert, clear speech and steady gait.

## 2024-01-08 NOTE — ED ADULT NURSE NOTE - OBJECTIVE STATEMENT
Pt. c/o RLQ pain x 3 years. Pt. states she has an extensive GI history, but has had numerous work ups with no results. Pt. denies chest pain/SOB. Pt. denies N/V/D.

## 2024-01-08 NOTE — ED PROVIDER NOTE - ATTENDING APP SHARED VISIT CONTRIBUTION OF CARE
I have reviewed and agree with the mid-level note, except as documented in my note below.    83-year-old female history of HTN, HLD, COPD (not on home oxygen), SBO, denies significant PSH, non-smoker, denies daily EtOH use, also reports chronic lower abdominal pain for many years with nondiagnostic workup, now presents with worsening of her right lower quadrant pain, last bowel movement was yesterday, passing flatus, denies fever, n/v/d, anorexia, cough, respiratory sx, change in bowel habits or urinary sx, vaginal d/c or other associated complaints at present.     VSS, awake, alert, non-toxic appearing, no scleral icterus, oropharynx clear, mmm, no jaundice, skin rash or lesions, chest CTAB, non-labored breathing, no w/r/r, +S1/S2, RRR, no m/r/g, abdomen soft, mild-mod ttp rlq w/o peritoneal signs, +BS, no hernias or distention, no pulsatile masses or bruits appreciated, no CVA tenderness, no peripheral edema or deformities, alert, clear speech and steady gait.

## 2024-01-08 NOTE — ED PROVIDER NOTE - CARE PROVIDERS DIRECT ADDRESSES
[email or web address]

## 2024-01-08 NOTE — ED PROVIDER NOTE - PROGRESS NOTE DETAILS
bh: Incidental findings were discussed with the patient and a copy of the findings were provided. Was advised that he patient follow up as an outpatient for further evaluation and management of these findings. The patient verbalized that they understand these instructions.

## 2024-01-08 NOTE — ED PROVIDER NOTE - OBJECTIVE STATEMENT
83-year-old female history of HTN, HLD, COPD (not on home oxygen), SBO, denies significant PSH, non-smoker, denies daily EtOH use, also reports chronic lower abdominal pain for many years with nondiagnostic workup, now presents with worsening of her right lower quadrant pain, last bowel movement was yesterday, passing flatus, denies fever, n/v/d, anorexia, cough, respiratory sx, change in bowel habits or urinary sx, vaginal d/c or other associated complaints at present.

## 2024-01-08 NOTE — ED ADULT NURSE NOTE - NSFALLUNIVINTERV_ED_ALL_ED
Bed/Stretcher in lowest position, wheels locked, appropriate side rails in place/Call bell, personal items and telephone in reach/Instruct patient to call for assistance before getting out of bed/chair/stretcher/Non-slip footwear applied when patient is off stretcher/[place] to call system/Physically safe environment - no spills, clutter or unnecessary equipment/Purposeful proactive rounding/Room/bathroom lighting operational, light cord in reach Bed/Stretcher in lowest position, wheels locked, appropriate side rails in place/Call bell, personal items and telephone in reach/Instruct patient to call for assistance before getting out of bed/chair/stretcher/Non-slip footwear applied when patient is off stretcher/[place] to call system/Physically safe environment - no spills, clutter or unnecessary equipment/Purposeful proactive rounding/Room/bathroom lighting operational, light cord in reach

## 2024-01-08 NOTE — ED PROVIDER NOTE - CARE PROVIDER_API CALL
Mohan Moon  Gastroenterology  [address] [place] [address]  Phone: [phone]  Fax: [phone]  Follow Up Time:    Mohan Moon  Gastroenterology  [address] [place] [address]  Phone: [phone]  Fax: [phone]  Follow Up Time:

## 2024-01-08 NOTE — ED PROVIDER NOTE - PATIENT PORTAL LINK FT
You can access the FollowMyHealth Patient Portal offered by [facility] by registering at the following website: [email or web address]. By joining [employer]’s FollowMyHealth portal, you will also be able to view your health information using other applications (apps) compatible with our system. You can access the FollowMyHealth Patient Portal offered by [facility] by registering at the following website: [email or web address]. By joining [employer]’s FollowMyHealth portal, you will also be able to view your health information using other applications (apps) compatible with our system.

## 2024-01-08 NOTE — ED ADULT TRIAGE NOTE - INTERNATIONAL TRAVEL
intolerance. No recent weight change. .  Hematologic/Lymphatic: Denies abnormal bruising or bleeding. No swollen lymph nodes    PHYSICAL EXAM:   BP (!) 160/75   Pulse 57   Temp 97.2 °F (36.2 °C) (Temporal)   Resp 18   Ht 5' 6\" (1.676 m)   Wt 181 lb (82.1 kg)   SpO2 100%   BMI 29.21 kg/m²   CONST:  Well developed, well nourished who appears of stated age. Awake, alert and cooperative. No apparent distress. HEENT:   Head- Normocephalic, atraumatic   Eyes- Conjunctivae pink, anicteric  Throat- Oral mucosa pink and moist  Neck-  No stridor, trachea midline, no jugular venous distention. No carotid bruit. CHEST: Chest symmetrical and tender to palpation. No accessory muscle use or intercostal retractions  RESPIRATORY: Lung sounds - clear throughout fields   CARDIOVASCULAR:     Heart Inspection- shows no noted pulsations  Heart Palpation- no heaves or thrills; PMI is non-displaced   Heart Ausculation- Regular rate and rhythm, no murmur. No s3, s4 or rub   PV: No lower extremity edema. No varicosities. Pedal pulses palpable, no clubbing or cyanosis   ABDOMEN: Soft, non-tender to light palpation. Bowel sounds present. No palpable masses no organomegaly; no abdominal bruit  MS: Good muscle strength and tone. No atrophy or abnormal movements. : Deferred  SKIN: Warm and dry no statis dermatitis or ulcers   NEURO / PSYCH: Oriented to person, place and time. Speech clear and appropriate. Follows all commands.  Pleasant affect     DATA:    ECG / Tele strips: Normal sinus rhythm  Diagnostic:      Intake/Output Summary (Last 24 hours) at 4/2/2023 0757  Last data filed at 4/2/2023 0318  Gross per 24 hour   Intake --   Output 150 ml   Net -150 ml       Labs:   CBC:   Recent Labs     04/01/23  1207   WBC 8.4   HGB 14.4   HCT 41.7        BMP:   Recent Labs     04/01/23  1207      K 4.0   CO2 26   BUN 10   CREATININE 0.7   LABGLOM >60   CALCIUM 9.3     Mag: No results for input(s): MG in the last 72 No

## 2024-01-22 ENCOUNTER — APPOINTMENT (OUTPATIENT)
Dept: PULMONOLOGY | Facility: CLINIC | Age: 84
End: 2024-01-22
Payer: MEDICARE

## 2024-01-22 VITALS
OXYGEN SATURATION: 95 % | SYSTOLIC BLOOD PRESSURE: 140 MMHG | RESPIRATION RATE: 14 BRPM | WEIGHT: 120 LBS | BODY MASS INDEX: 22.66 KG/M2 | HEART RATE: 78 BPM | HEIGHT: 61 IN | DIASTOLIC BLOOD PRESSURE: 82 MMHG

## 2024-01-22 DIAGNOSIS — R93.89 ABNORMAL FINDINGS ON DIAGNOSTIC IMAGING OF OTHER SPECIFIED BODY STRUCTURES: ICD-10-CM

## 2024-01-22 DIAGNOSIS — J44.9 CHRONIC OBSTRUCTIVE PULMONARY DISEASE, UNSPECIFIED: ICD-10-CM

## 2024-01-22 DIAGNOSIS — R05.3 CHRONIC COUGH: ICD-10-CM

## 2024-01-22 PROCEDURE — 99214 OFFICE O/P EST MOD 30 MIN: CPT

## 2024-01-22 NOTE — PHYSICAL EXAM
[No Acute Distress] : no acute distress [Normal Oropharynx] : normal oropharynx [No Neck Mass] : no neck mass [Normal Appearance] : normal appearance [Normal Rate/Rhythm] : normal rate/rhythm [Normal S1, S2] : normal s1, s2 [No Murmurs] : no murmurs [No Resp Distress] : no resp distress [Clear to Auscultation Bilaterally] : clear to auscultation bilaterally [No Abnormalities] : no abnormalities [Benign] : benign [Normal Gait] : normal gait [No Clubbing] : no clubbing [No Cyanosis] : no cyanosis [No Edema] : no edema [FROM] : FROM [Normal Color/ Pigmentation] : normal color/ pigmentation [Oriented x3] : oriented x3 [No Focal Deficits] : no focal deficits [Normal Affect] : normal affect

## 2024-01-22 NOTE — ASSESSMENT
[FreeTextEntry1] : Moderate COPD well compensated on Breo Significant smoking history quit 2010 New 6 mm lung nodules improved. New 7 mm lung nodule possibly stable to smaller on repeat CT.  Being treated for GERD.

## 2024-01-22 NOTE — HISTORY OF PRESENT ILLNESS
[Doing Well] : doing well [None] : The patient is not currently on any medications for ~his/her~ COPD [Adherent] : the patient is adherent with ~his/her~ medication regimen [Goals--Doing Well] : the patient is doing well with ~his/her~ COPD goals [PFTs] : pulmonary function tests [Cough] : no cough [Follow-Up - Routine Clinic] : a routine clinic follow-up of [Non-Productive Cough] : no non-productive cough [Currently Experiencing] : The patient is currently experiencing symptoms.

## 2024-01-25 ENCOUNTER — APPOINTMENT (OUTPATIENT)
Dept: PAIN MANAGEMENT | Facility: CLINIC | Age: 84
End: 2024-01-25
Payer: MEDICARE

## 2024-01-25 DIAGNOSIS — M54.16 RADICULOPATHY, LUMBAR REGION: ICD-10-CM

## 2024-01-25 PROCEDURE — 99214 OFFICE O/P EST MOD 30 MIN: CPT

## 2024-01-25 NOTE — ASSESSMENT
[FreeTextEntry1] : This is an 83-year-old female presenting to our walk-in clinic to establish care for back and hip pain.  She reports that 3 days after her prior appointment, she went to the hospital where was discovered she had gone her tailbone. The pain rendered her immobile and she was bed ridden for period of time.  X-rays of the lumbar spine and left hip were reviewed today. I will send Lyrica 50 mg TID with the hopes it provides her moderate relief of her symptoms.  In the interim I will provide her a prescription to trial physical therapy for symptom control. She will follow-up in 4 weeks for reassessment. All this patients questions were answered and the conversation was understood well.  Physical therapy of the lumbar spine 2-3 times a week for 4-8 weeks stressing a home exercise program of walking, shoulder girdle strengthening,  swimming, elliptical , recumbent bike, Jorge chi and Yoga. Use things that heat like hot shower or icy heat before rehab, exercising and at the beginning of the day, and ice (ice in a bag never directly on the skin) after activity and at the end of the day.  I, Karolyn Huddleston, attest that this documentation has been prepared under the direction and in the presence of Provider Sara Vera MD.   Thank you for allowing me to assist in the management of this patient.    Best Regards,    Sara Vera M.D., FAAPMR   Diplomate, American Board of Physical Medicine and Rehabilitation Diplomate, American Board of Pain Medicine  Diplomate, American Board of Pain Management

## 2024-01-25 NOTE — HISTORY OF PRESENT ILLNESS
[FreeTextEntry1] : ORIGINAL PRESENTATION: Ms. Lamb is an 83-year-old female presenting to our walk-in clinic to establish care for back and hip pain. She states she has hip pain that radiates down to left buttock and occasionally radiates down her leg while walking for the last 2 days. She rates the pain a 9/10 on the pain scale. She has limitations performing ADLs secondary to the pain.   PATIENT PRESENTS FOR FOLLOW UP: Patient is accompanied by her . She is under our care for lower back and hip pain. She states that after her previous visit, the pain was severe and caused her to go to the hospital where it was determined she had broken her tailbone in 2 places. She is not able to sit without pain and was immobile for some time. X-rays of the left hip and lumbar were reviewed today and are documented below. The MRI of the lumbar spine was denied by her insurance company. Her  states that she was provided with Tramadol 50 mg BID which provided her him some relief of her symptoms. Her  reports that she has been taking his Lyrica which provides her better relief of her symptoms. She has not trialed physical therapy for her pain. I have agreed to prescribe her Lyrica 50 mg TID for symptom control.

## 2024-01-25 NOTE — DATA REVIEWED
[FreeTextEntry1] : X-ray of the lumbar spine dated 10/31/2023 finds mildly diminished bone density.  No evidence of fracture or dislocation.  Multilevel spondylosis with severe L5-S1 disc degeneration.  Grade 1 grade 2 anterior degenerative listhesis of L4 relative to L5.  Calcification of the lower abdominal aorta and its branches.  Mild SI joint sclerosis.  X-ray of the left hip dated 10/31/2023 demonstrates no acute osseous injury.  The hip joint is preserved.  There is diffuse osteopenia.  Increased sclerosis along the left pelvic rim seen on both views is partially secured by metallic clip like device on the lateral view.  No osseous destruction is appreciated.  Correlation with AP and oblique radiographs of the pelvis is suggested.  Degenerative disc disease is likely present at L5-S1.  visualized soft tissues appear unremarkable.   UDS: No data obtained today [place] REGISTRY: Checked.

## 2024-02-22 ENCOUNTER — APPOINTMENT (OUTPATIENT)
Dept: PAIN MANAGEMENT | Facility: CLINIC | Age: 84
End: 2024-02-22

## 2024-02-28 ENCOUNTER — APPOINTMENT (OUTPATIENT)
Dept: CARDIOLOGY | Facility: CLINIC | Age: 84
End: 2024-02-28
Payer: MEDICARE

## 2024-02-28 VITALS
SYSTOLIC BLOOD PRESSURE: 136 MMHG | BODY MASS INDEX: 22.47 KG/M2 | HEART RATE: 66 BPM | HEIGHT: 61 IN | WEIGHT: 119 LBS | DIASTOLIC BLOOD PRESSURE: 84 MMHG

## 2024-02-28 DIAGNOSIS — I70.203 UNSPECIFIED ATHEROSCLEROSIS OF NATIVE ARTERIES OF EXTREMITIES, BILATERAL LEGS: ICD-10-CM

## 2024-02-28 DIAGNOSIS — I10 ESSENTIAL (PRIMARY) HYPERTENSION: ICD-10-CM

## 2024-02-28 DIAGNOSIS — I25.10 ATHEROSCLEROTIC HEART DISEASE OF NATIVE CORONARY ARTERY W/OUT ANGINA PECTORIS: ICD-10-CM

## 2024-02-28 PROCEDURE — 99204 OFFICE O/P NEW MOD 45 MIN: CPT | Mod: 25

## 2024-02-28 PROCEDURE — 93000 ELECTROCARDIOGRAM COMPLETE: CPT

## 2024-02-28 RX ORDER — ALENDRONATE SODIUM 70 MG/1
70 TABLET ORAL
Qty: 12 | Refills: 0 | Status: DISCONTINUED | COMMUNITY
Start: 2019-01-02 | End: 2024-02-28

## 2024-02-28 RX ORDER — PREGABALIN 50 MG/1
50 CAPSULE ORAL
Qty: 90 | Refills: 0 | Status: DISCONTINUED | COMMUNITY
Start: 2024-01-25 | End: 2024-02-28

## 2024-02-28 RX ORDER — TIZANIDINE 4 MG/1
4 TABLET ORAL
Qty: 90 | Refills: 0 | Status: DISCONTINUED | COMMUNITY
Start: 2023-10-31 | End: 2024-02-28

## 2024-02-28 RX ORDER — TRAMADOL HYDROCHLORIDE 50 MG/1
50 TABLET, COATED ORAL
Qty: 42 | Refills: 0 | Status: DISCONTINUED | COMMUNITY
Start: 2023-11-07 | End: 2024-02-28

## 2024-02-28 RX ORDER — HYOSCYAMINE SULFATE 0.12 MG/1
0.12 TABLET ORAL
Refills: 0 | Status: DISCONTINUED | COMMUNITY
End: 2024-02-28

## 2024-02-28 NOTE — ASSESSMENT
[FreeTextEntry1] : Ms. Lamb is an 83-year-old woman with history of CAD (NSTEMI 1997, no PCI), moderate COPD with prior tobacco use (quit 2010), dyslipidemia, hypertension, and IBD presenting to re-establish care with a primary cardiologist.  Impression: (1) CAD, NSTEMI 1997 without PCI, stable (2) Moderate COPD/emphysema, following closely with pulm, quit smoking 2010 (3) Dyslipidemia (4) Hypertension  Plan: - MÓNICA/PVR - Carotid doppler - TTE - Low threshold for stress testing if she develops exertional chest pain or dyspnea that does not improve with inhaler use. - Repeat cardiometaboilc labs  Follow up 6 months, sooner as needed or if testing is abnormal

## 2024-02-28 NOTE — HISTORY OF PRESENT ILLNESS
[FreeTextEntry1] : Ms. Lamb is an 83-year-old woman with history of CAD (NSTEMI 1997, no PCI), moderate COPD with prior tobacco use (quit 2010), dyslipidemia, hypertension, and IBD presenting to re-establish care with a primary cardiologist.  Patient previously followed with Dr. Trevino and was last seen by Dr. Hdez for a one time visit in 2017. At that time was ordered for TTE, restarted on a statin, and continued on aspirin.  Today overall feels well, denies any active or recent cardiopulmonary complaints. Notes her COPD is well controlled with inhaler use.  CT Chest 8/2023 - Diffuse emphysema. R sided pulmonary nodules measuring up to 6mm. Follow up in 6 months recommended. - Coronary artery and aortic atherosclerotic calcifications.  TTE 2017 - EF 60%, G1DD, mild MR  Labs: - Hgb 12.7, Plt 311 - Cr 1.2, K 4.4, normal transaminases - 2017 , HDL 68, LDL 90, TG 58, TSH 2.18  Meds: - ASA 81mg - Atorva 40mg - Enalapril 10mg - Spiriva - Symbicort - Breo

## 2024-02-28 NOTE — PHYSICAL EXAM
[Well Developed] : well developed [No Acute Distress] : no acute distress [Well Nourished] : well nourished [Normal Venous Pressure] : normal venous pressure [Normal Conjunctiva] : normal conjunctiva [No Carotid Bruit] : no carotid bruit [Normal S1, S2] : normal S1, S2 [No Rub] : no rub [No Murmur] : no murmur [No Gallop] : no gallop [Clear Lung Fields] : clear lung fields [No Respiratory Distress] : no respiratory distress  [Good Air Entry] : good air entry [Soft] : abdomen soft [Non Tender] : non-tender [Normal Bowel Sounds] : normal bowel sounds [No Masses/organomegaly] : no masses/organomegaly [Normal Gait] : normal gait [No Cyanosis] : no cyanosis [No Edema] : no edema [No Clubbing] : no clubbing [No Varicosities] : no varicosities [No Rash] : no rash [Moves all extremities] : moves all extremities [No Skin Lesions] : no skin lesions [Normal Speech] : normal speech [No Focal Deficits] : no focal deficits [Alert and Oriented] : alert and oriented [Normal memory] : normal memory

## 2024-03-18 ENCOUNTER — OUTPATIENT (OUTPATIENT)
Dept: OUTPATIENT SERVICES | Facility: HOSPITAL | Age: 84
LOS: 1 days | End: 2024-03-18
Payer: MEDICARE

## 2024-03-18 ENCOUNTER — APPOINTMENT (OUTPATIENT)
Dept: HEMATOLOGY ONCOLOGY | Facility: CLINIC | Age: 84
End: 2024-03-18
Payer: MEDICARE

## 2024-03-18 VITALS
WEIGHT: 123 LBS | SYSTOLIC BLOOD PRESSURE: 159 MMHG | DIASTOLIC BLOOD PRESSURE: 80 MMHG | TEMPERATURE: 97.9 F | HEART RATE: 69 BPM | HEIGHT: 62 IN | BODY MASS INDEX: 22.63 KG/M2

## 2024-03-18 DIAGNOSIS — D72.810 LYMPHOCYTOPENIA: ICD-10-CM

## 2024-03-18 LAB
BASOPHILS # BLD AUTO: 0.04 K/UL
BASOPHILS NFR BLD AUTO: 0.5 %
EOSINOPHIL # BLD AUTO: 0.12 K/UL
EOSINOPHIL NFR BLD AUTO: 1.4 %
HCT VFR BLD CALC: 38.3 %
HGB BLD-MCNC: 12.7 G/DL
IMM GRANULOCYTES NFR BLD AUTO: 0.4 %
LYMPHOCYTES # BLD AUTO: 1.56 K/UL
LYMPHOCYTES NFR BLD AUTO: 18.5 %
MAN DIFF?: NORMAL
MCHC RBC-ENTMCNC: 31.8 PG
MCHC RBC-ENTMCNC: 33.2 G/DL
MCV RBC AUTO: 95.8 FL
MONOCYTES # BLD AUTO: 0.82 K/UL
MONOCYTES NFR BLD AUTO: 9.7 %
NEUTROPHILS # BLD AUTO: 5.87 K/UL
NEUTROPHILS NFR BLD AUTO: 69.5 %
PLATELET # BLD AUTO: 290 K/UL
PMV BLD AUTO: 0 /100 WBCS
RBC # BLD: 4 M/UL
RBC # FLD: 13.5 %
WBC # FLD AUTO: 8.44 K/UL

## 2024-03-18 PROCEDURE — 82747 ASSAY OF FOLIC ACID RBC: CPT

## 2024-03-18 PROCEDURE — 82607 VITAMIN B-12: CPT

## 2024-03-18 PROCEDURE — 86706 HEP B SURFACE ANTIBODY: CPT

## 2024-03-18 PROCEDURE — 85027 COMPLETE CBC AUTOMATED: CPT

## 2024-03-18 PROCEDURE — 86803 HEPATITIS C AB TEST: CPT

## 2024-03-18 PROCEDURE — 99204 OFFICE O/P NEW MOD 45 MIN: CPT

## 2024-03-18 PROCEDURE — 86704 HEP B CORE ANTIBODY TOTAL: CPT

## 2024-03-18 PROCEDURE — 80048 BASIC METABOLIC PNL TOTAL CA: CPT

## 2024-03-18 PROCEDURE — 80076 HEPATIC FUNCTION PANEL: CPT

## 2024-03-18 PROCEDURE — 83921 ORGANIC ACID SINGLE QUANT: CPT

## 2024-03-18 RX ORDER — ALENDRONATE SODIUM 70 MG/1
70 TABLET ORAL
Refills: 0 | Status: ACTIVE | COMMUNITY

## 2024-03-18 RX ORDER — ENALAPRIL MALEATE 10 MG/1
10 TABLET ORAL
Refills: 0 | Status: ACTIVE | COMMUNITY

## 2024-03-18 RX ORDER — ROSUVASTATIN CALCIUM 10 MG/1
10 TABLET, FILM COATED ORAL
Refills: 0 | Status: ACTIVE | COMMUNITY

## 2024-03-18 NOTE — REVIEW OF SYSTEMS
[Diarrhea: Grade 0] : Diarrhea: Grade 0 [Negative] : Allergic/Immunologic [Joint Pain] : joint pain [Fever] : no fever [Night Sweats] : no night sweats [Easy Bleeding] : no tendency for easy bleeding [FreeTextEntry9] : hx of fractured back, chronic musculoskeletal discomfort

## 2024-03-18 NOTE — HISTORY OF PRESENT ILLNESS
[de-identified] : Ms. CHRIS APODACA is an 83 year old female here today for evaluation and management of lymphopenia.     CHRIS is an 83 year old F with PMHx including Arthritis, Atherosclerosis, COPD, hypertension, GERD, myocardial infarct, IBD, Lichen sclerosis, hyperlipidemia and osteoporsis, who presents to clinic to establish care, accompanied by spouse at initial visit.  She is presently feeling well today but reports chronic musculoskeletal pain.  Patient denies fever, chills, drenching night sweats, frequent infections, vomiting, dyspnea, unintentional weight loss or bleeding.  Patient reports family history of malignancy in her mother (colon cancer) and father (lung cancer, dx in 60s, smoker).  Former smoker, quit 20+ years ago.    LAB WORKUP  (1.8.2024) WBC 7.2, Hgb 12.7, , ANC 4.98, ALC 1.16, Mono 0.9, Eos 0.08, Baso 0.04, Cr 1.2, eGFR 45  (11.4.2023) WBC 12.36, Hgb 12.7, , ANC 10.04, ALC 0.92, Mono 1.17, Eos 0.11, Baso 0.05  (1.15.2022) WBC 10.42, Hgb 14.3, , ANC 7.12, ALC 2, Mono 1.09, Eos 0.12, Baso 0.05   HCM  Colonoscopy done around 2020  Upper Endoscopy done 5+ years ago, reportedly normal  Mammography done 07/2023 BI-RADS Category 1:  Negative  GYN Pelvic Exam/pap done around 2020, reportedly normal

## 2024-03-18 NOTE — CONSULT LETTER
[Dear  ___] : Dear  [unfilled], [Please see my note below.] : Please see my note below. [Sincerely,] : Sincerely, [FreeTextEntry3] : Donavon Salas NP

## 2024-03-18 NOTE — ASSESSMENT
[FreeTextEntry1] : # Lymphopenia, intermittent since 2023 - asymptomatic; possibly due to transient infection since previously normal  - Labwork today: CBC, BMP, LFTs, Vitamin B12, Folate, MMA, Hepatitis B/C testing   RTC in 2 months with CBC, sooner if needed   SEEN/ examined w/ NP Amrik; note reviewed;case discussed; agree w plan 84 yo woman is evalauted for lymphopenia. Do not have records from years back to estimate the trend and determine how acute it is not; the total number of WBC, RBC, platelets are normal. Lymphopenia is mild and does not seem to be symptomatic. Will do basic labs including viral labs, B12, folate, MMA, although doubt the search will be productive. Will request labs from primary care and repeat labs today; recommends she follows with her GI since her abdomeinal pain in 01/2024 was strong enough to go to ED. CT A/P was not remarkable.

## 2024-03-18 NOTE — REASON FOR VISIT
[Initial Consultation] : an initial consultation for [Spouse] : spouse [FreeTextEntry2] : Lymphopenia

## 2024-03-19 DIAGNOSIS — D72.810 LYMPHOCYTOPENIA: ICD-10-CM

## 2024-03-19 LAB
ALBUMIN SERPL ELPH-MCNC: 4.5 G/DL
ALP BLD-CCNC: 93 U/L
ALT SERPL-CCNC: 11 U/L
ANION GAP SERPL CALC-SCNC: 15 MMOL/L
AST SERPL-CCNC: 22 U/L
BILIRUB DIRECT SERPL-MCNC: <0.2 MG/DL
BILIRUB INDIRECT SERPL-MCNC: >0.1 MG/DL
BILIRUB SERPL-MCNC: 0.3 MG/DL
BUN SERPL-MCNC: 35 MG/DL
CALCIUM SERPL-MCNC: 9.5 MG/DL
CHLORIDE SERPL-SCNC: 100 MMOL/L
CO2 SERPL-SCNC: 21 MMOL/L
CREAT SERPL-MCNC: 1.4 MG/DL
EGFR: 38 ML/MIN/1.73M2
GLUCOSE SERPL-MCNC: 77 MG/DL
HBV CORE IGG+IGM SER QL: NONREACTIVE
HBV SURFACE AB SER QL: NONREACTIVE
HCV AB SER QL: NONREACTIVE
HCV S/CO RATIO: 0.07 S/CO
POTASSIUM SERPL-SCNC: 4.3 MMOL/L
PROT SERPL-MCNC: 7.6 G/DL
SODIUM SERPL-SCNC: 136 MMOL/L
VIT B12 SERPL-MCNC: 1067 PG/ML

## 2024-03-20 ENCOUNTER — APPOINTMENT (OUTPATIENT)
Dept: CARDIOLOGY | Facility: CLINIC | Age: 84
End: 2024-03-20
Payer: MEDICARE

## 2024-03-20 LAB
FOLATE RBC-MCNC: 1207 NG/ML
HCT VFR BLD CALC: 40.6 %

## 2024-03-20 PROCEDURE — 93306 TTE W/DOPPLER COMPLETE: CPT

## 2024-03-20 PROCEDURE — 93880 EXTRACRANIAL BILAT STUDY: CPT

## 2024-03-27 LAB — METHYLMALONATE SERPL-SCNC: 256 NMOL/L

## 2024-05-22 ENCOUNTER — OUTPATIENT (OUTPATIENT)
Dept: OUTPATIENT SERVICES | Facility: HOSPITAL | Age: 84
LOS: 1 days | End: 2024-05-22
Payer: MEDICARE

## 2024-05-22 ENCOUNTER — LABORATORY RESULT (OUTPATIENT)
Age: 84
End: 2024-05-22

## 2024-05-22 ENCOUNTER — APPOINTMENT (OUTPATIENT)
Age: 84
End: 2024-05-22
Payer: MEDICARE

## 2024-05-22 VITALS
HEART RATE: 72 BPM | BODY MASS INDEX: 23.03 KG/M2 | TEMPERATURE: 97.7 F | SYSTOLIC BLOOD PRESSURE: 153 MMHG | HEIGHT: 61 IN | RESPIRATION RATE: 16 BRPM | DIASTOLIC BLOOD PRESSURE: 83 MMHG | WEIGHT: 122 LBS | OXYGEN SATURATION: 98 %

## 2024-05-22 DIAGNOSIS — Z86.2 PERSONAL HISTORY OF DISEASES OF THE BLOOD AND BLOOD-FORMING ORGANS AND CERTAIN DISORDERS INVOLVING THE IMMUNE MECHANISM: ICD-10-CM

## 2024-05-22 LAB
HCT VFR BLD CALC: 36.6 %
HGB BLD-MCNC: 12.5 G/DL
MCHC RBC-ENTMCNC: 33 PG
MCHC RBC-ENTMCNC: 34.2 G/DL
MCV RBC AUTO: 96.6 FL
PLATELET # BLD AUTO: 218 K/UL
PMV BLD: 9.7 FL
RBC # BLD: 3.79 M/UL
RBC # FLD: 13.4 %
WBC # FLD AUTO: 7.37 K/UL

## 2024-05-22 PROCEDURE — 99214 OFFICE O/P EST MOD 30 MIN: CPT

## 2024-05-22 PROCEDURE — 85027 COMPLETE CBC AUTOMATED: CPT

## 2024-05-22 NOTE — ASSESSMENT
[FreeTextEntry1] : # Lymphopenia, intermittent since 2023 - asymptomatic; possibly due to transient infection since previously normal  - Lymphopenia is mild, intermittent and does not seem to be symptomatic  RTC in 6 months with Dr. Basurto with CBC, sooner if needed   SEEN/ examined w/ NP Amrik; note reviewed;case discussed; agree w plan

## 2024-05-22 NOTE — HISTORY OF PRESENT ILLNESS
[de-identified] : Ms. CHRIS APODACA is an 83 year old female here today for evaluation and management of lymphopenia.     CHRIS is an 83 year old F with PMHx including Arthritis, Atherosclerosis, COPD, hypertension, GERD, myocardial infarct, IBD, Lichen sclerosis, hyperlipidemia and osteoporsis, who presents to clinic to establish care, accompanied by spouse at initial visit.  She is presently feeling well today but reports chronic musculoskeletal pain.  Patient denies fever, chills, drenching night sweats, frequent infections, vomiting, dyspnea, unintentional weight loss or bleeding.  Patient reports family history of malignancy in her mother (colon cancer) and father (lung cancer, dx in 60s, smoker).  Former smoker, quit 20+ years ago.    LAB WORKUP  (1.8.2024) WBC 7.2, Hgb 12.7, , ANC 4.98, ALC 1.16, Mono 0.9, Eos 0.08, Baso 0.04, Cr 1.2, eGFR 45  (11.4.2023) WBC 12.36, Hgb 12.7, , ANC 10.04, ALC 0.92, Mono 1.17, Eos 0.11, Baso 0.05  (1.15.2022) WBC 10.42, Hgb 14.3, , ANC 7.12, ALC 2, Mono 1.09, Eos 0.12, Baso 0.05   HCM  Colonoscopy done around 2020  Upper Endoscopy done 5+ years ago, reportedly normal  Mammography done 07/2023 BI-RADS Category 1:  Negative  GYN Pelvic Exam/pap done around 2020, reportedly normal  [de-identified] : 5/22/24 Patient is here for a follow-up visit for history of lymphopenia, accompanied by spouse.  She is feeling well with no new complaints.  Reviewed most recent CBC, which shows normal total WBC and slight lymphopenia.  Persistent renal dysfunction noted; she is following with nephrologist.  She recently had dental procedure performed last week with some scant bleeding following procedure.  Patient denies fever, chills, drenching night sweats, dyspnea or other bleeding.

## 2024-05-22 NOTE — REVIEW OF SYSTEMS
[Diarrhea: Grade 0] : Diarrhea: Grade 0 [Joint Pain] : joint pain [Negative] : Allergic/Immunologic [Fever] : no fever [Night Sweats] : no night sweats [Easy Bleeding] : no tendency for easy bleeding [FreeTextEntry9] : hx of fractured back, chronic musculoskeletal discomfort

## 2024-05-22 NOTE — PHYSICAL EXAM
[Restricted in physically strenuous activity but ambulatory and able to carry out work of a light or sedentary nature] : Status 1- Restricted in physically strenuous activity but ambulatory and able to carry out work of a light or sedentary nature, e.g., light house work, office work [Normal] : affect appropriate [de-identified] : wearing glasses

## 2024-05-23 DIAGNOSIS — Z86.2 PERSONAL HISTORY OF DISEASES OF THE BLOOD AND BLOOD-FORMING ORGANS AND CERTAIN DISORDERS INVOLVING THE IMMUNE MECHANISM: ICD-10-CM

## 2024-08-04 ENCOUNTER — OUTPATIENT (OUTPATIENT)
Dept: OUTPATIENT SERVICES | Facility: HOSPITAL | Age: 84
LOS: 1 days | End: 2024-08-04
Payer: MEDICARE

## 2024-08-04 DIAGNOSIS — R91.1 SOLITARY PULMONARY NODULE: ICD-10-CM

## 2024-08-04 PROCEDURE — 71250 CT THORAX DX C-: CPT | Mod: 26

## 2024-08-04 PROCEDURE — 71250 CT THORAX DX C-: CPT

## 2024-08-05 DIAGNOSIS — R91.1 SOLITARY PULMONARY NODULE: ICD-10-CM

## 2024-09-06 ENCOUNTER — APPOINTMENT (OUTPATIENT)
Dept: PULMONOLOGY | Facility: CLINIC | Age: 84
End: 2024-09-06
Payer: MEDICARE

## 2024-09-06 VITALS
DIASTOLIC BLOOD PRESSURE: 82 MMHG | RESPIRATION RATE: 14 BRPM | OXYGEN SATURATION: 98 % | WEIGHT: 120 LBS | HEART RATE: 71 BPM | SYSTOLIC BLOOD PRESSURE: 142 MMHG | BODY MASS INDEX: 22.66 KG/M2 | HEIGHT: 61 IN

## 2024-09-06 DIAGNOSIS — R05.3 CHRONIC COUGH: ICD-10-CM

## 2024-09-06 DIAGNOSIS — R93.89 ABNORMAL FINDINGS ON DIAGNOSTIC IMAGING OF OTHER SPECIFIED BODY STRUCTURES: ICD-10-CM

## 2024-09-06 DIAGNOSIS — J44.9 CHRONIC OBSTRUCTIVE PULMONARY DISEASE, UNSPECIFIED: ICD-10-CM

## 2024-09-06 PROCEDURE — G2211 COMPLEX E/M VISIT ADD ON: CPT

## 2024-09-06 PROCEDURE — 99214 OFFICE O/P EST MOD 30 MIN: CPT

## 2024-09-06 NOTE — HISTORY OF PRESENT ILLNESS
[Doing Well] : doing well [None] : The patient is not currently on any medications for ~his/her~ COPD [Adherent] : the patient is adherent with ~his/her~ medication regimen [Goals--Doing Well] : the patient is doing well with ~his/her~ COPD goals [PFTs] : pulmonary function tests [Follow-Up - Routine Clinic] : a routine clinic follow-up of [Cough] : no cough [Non-Productive Cough] : no non-productive cough [Currently Experiencing] : The patient is currently experiencing symptoms.

## 2024-09-06 NOTE — ASSESSMENT
[FreeTextEntry1] : Moderate COPD well compensated on Breo Significant smoking history quit 2010 New 6 mm lung nodules improved. New 7 mm lung nodule possibly stable to smaller on repeat CT.  Chronic cough improved with GERD therapy.

## 2024-09-08 ENCOUNTER — EMERGENCY (EMERGENCY)
Facility: HOSPITAL | Age: 84
LOS: 0 days | Discharge: HOME | End: 2024-09-08
Attending: EMERGENCY MEDICINE
Payer: MEDICARE

## 2024-09-08 VITALS
TEMPERATURE: 98 F | DIASTOLIC BLOOD PRESSURE: 74 MMHG | HEART RATE: 67 BPM | OXYGEN SATURATION: 95 % | SYSTOLIC BLOOD PRESSURE: 162 MMHG | RESPIRATION RATE: 17 BRPM

## 2024-09-08 VITALS
OXYGEN SATURATION: 97 % | WEIGHT: 117.95 LBS | HEART RATE: 78 BPM | DIASTOLIC BLOOD PRESSURE: 79 MMHG | SYSTOLIC BLOOD PRESSURE: 178 MMHG | RESPIRATION RATE: 16 BRPM | TEMPERATURE: 98 F | HEIGHT: 62 IN

## 2024-09-08 DIAGNOSIS — M54.50 LOW BACK PAIN, UNSPECIFIED: ICD-10-CM

## 2024-09-08 DIAGNOSIS — R10.9 UNSPECIFIED ABDOMINAL PAIN: ICD-10-CM

## 2024-09-08 LAB
ALBUMIN SERPL ELPH-MCNC: 4.3 G/DL — SIGNIFICANT CHANGE UP (ref 3.5–5.2)
ALP SERPL-CCNC: 71 U/L — SIGNIFICANT CHANGE UP (ref 30–115)
ALT FLD-CCNC: 10 U/L — SIGNIFICANT CHANGE UP (ref 0–41)
ANION GAP SERPL CALC-SCNC: 12 MMOL/L — SIGNIFICANT CHANGE UP (ref 7–14)
APPEARANCE UR: CLEAR — SIGNIFICANT CHANGE UP
AST SERPL-CCNC: 22 U/L — SIGNIFICANT CHANGE UP (ref 0–41)
BACTERIA # UR AUTO: ABNORMAL /HPF
BASOPHILS # BLD AUTO: 0.05 K/UL — SIGNIFICANT CHANGE UP (ref 0–0.2)
BASOPHILS NFR BLD AUTO: 0.9 % — SIGNIFICANT CHANGE UP (ref 0–1)
BILIRUB DIRECT SERPL-MCNC: <0.2 MG/DL — SIGNIFICANT CHANGE UP (ref 0–0.3)
BILIRUB INDIRECT FLD-MCNC: >0.1 MG/DL — LOW (ref 0.2–1.2)
BILIRUB SERPL-MCNC: 0.3 MG/DL — SIGNIFICANT CHANGE UP (ref 0.2–1.2)
BILIRUB UR-MCNC: NEGATIVE — SIGNIFICANT CHANGE UP
BUN SERPL-MCNC: 21 MG/DL — HIGH (ref 10–20)
CALCIUM SERPL-MCNC: 9.6 MG/DL — SIGNIFICANT CHANGE UP (ref 8.4–10.5)
CHLORIDE SERPL-SCNC: 94 MMOL/L — LOW (ref 98–110)
CO2 SERPL-SCNC: 29 MMOL/L — SIGNIFICANT CHANGE UP (ref 17–32)
COLOR SPEC: YELLOW — SIGNIFICANT CHANGE UP
CREAT SERPL-MCNC: 1.2 MG/DL — SIGNIFICANT CHANGE UP (ref 0.7–1.5)
DIFF PNL FLD: NEGATIVE — SIGNIFICANT CHANGE UP
EGFR: 45 ML/MIN/1.73M2 — LOW
EOSINOPHIL # BLD AUTO: 0.08 K/UL — SIGNIFICANT CHANGE UP (ref 0–0.7)
EOSINOPHIL NFR BLD AUTO: 1.4 % — SIGNIFICANT CHANGE UP (ref 0–8)
EPI CELLS # UR: PRESENT
GLUCOSE SERPL-MCNC: 107 MG/DL — HIGH (ref 70–99)
GLUCOSE UR QL: NEGATIVE MG/DL — SIGNIFICANT CHANGE UP
HCT VFR BLD CALC: 38 % — SIGNIFICANT CHANGE UP (ref 37–47)
HGB BLD-MCNC: 12.6 G/DL — SIGNIFICANT CHANGE UP (ref 12–16)
IMM GRANULOCYTES NFR BLD AUTO: 0.3 % — SIGNIFICANT CHANGE UP (ref 0.1–0.3)
KETONES UR-MCNC: ABNORMAL MG/DL
LEUKOCYTE ESTERASE UR-ACNC: NEGATIVE — SIGNIFICANT CHANGE UP
LIDOCAIN IGE QN: 45 U/L — SIGNIFICANT CHANGE UP (ref 7–60)
LYMPHOCYTES # BLD AUTO: 1.13 K/UL — LOW (ref 1.2–3.4)
LYMPHOCYTES # BLD AUTO: 19.8 % — LOW (ref 20.5–51.1)
MCHC RBC-ENTMCNC: 32.7 PG — HIGH (ref 27–31)
MCHC RBC-ENTMCNC: 33.2 G/DL — SIGNIFICANT CHANGE UP (ref 32–37)
MCV RBC AUTO: 98.7 FL — SIGNIFICANT CHANGE UP (ref 81–99)
MONOCYTES # BLD AUTO: 0.61 K/UL — HIGH (ref 0.1–0.6)
MONOCYTES NFR BLD AUTO: 10.7 % — HIGH (ref 1.7–9.3)
NEUTROPHILS # BLD AUTO: 3.83 K/UL — SIGNIFICANT CHANGE UP (ref 1.4–6.5)
NEUTROPHILS NFR BLD AUTO: 66.9 % — SIGNIFICANT CHANGE UP (ref 42.2–75.2)
NITRITE UR-MCNC: NEGATIVE — SIGNIFICANT CHANGE UP
NRBC # BLD: 0 /100 WBCS — SIGNIFICANT CHANGE UP (ref 0–0)
PH UR: 7.5 — SIGNIFICANT CHANGE UP (ref 5–8)
PLATELET # BLD AUTO: 304 K/UL — SIGNIFICANT CHANGE UP (ref 130–400)
PMV BLD: 9.3 FL — SIGNIFICANT CHANGE UP (ref 7.4–10.4)
POTASSIUM SERPL-MCNC: 3.9 MMOL/L — SIGNIFICANT CHANGE UP (ref 3.5–5)
POTASSIUM SERPL-SCNC: 3.9 MMOL/L — SIGNIFICANT CHANGE UP (ref 3.5–5)
PROT SERPL-MCNC: 7.6 G/DL — SIGNIFICANT CHANGE UP (ref 6–8)
PROT UR-MCNC: 30 MG/DL
RBC # BLD: 3.85 M/UL — LOW (ref 4.2–5.4)
RBC # FLD: 12.6 % — SIGNIFICANT CHANGE UP (ref 11.5–14.5)
RBC CASTS # UR COMP ASSIST: 2 /HPF — SIGNIFICANT CHANGE UP (ref 0–4)
SODIUM SERPL-SCNC: 135 MMOL/L — SIGNIFICANT CHANGE UP (ref 135–146)
SP GR SPEC: >1.03 — HIGH (ref 1–1.03)
SQUAMOUS # UR AUTO: 2 /HPF — SIGNIFICANT CHANGE UP (ref 0–5)
UROBILINOGEN FLD QL: 1 MG/DL — SIGNIFICANT CHANGE UP (ref 0.2–1)
WBC # BLD: 5.72 K/UL — SIGNIFICANT CHANGE UP (ref 4.8–10.8)
WBC # FLD AUTO: 5.72 K/UL — SIGNIFICANT CHANGE UP (ref 4.8–10.8)
WBC UR QL: 3 /HPF — SIGNIFICANT CHANGE UP (ref 0–5)

## 2024-09-08 PROCEDURE — 80048 BASIC METABOLIC PNL TOTAL CA: CPT

## 2024-09-08 PROCEDURE — 81001 URINALYSIS AUTO W/SCOPE: CPT

## 2024-09-08 PROCEDURE — 74177 CT ABD & PELVIS W/CONTRAST: CPT | Mod: 26,MC

## 2024-09-08 PROCEDURE — 74177 CT ABD & PELVIS W/CONTRAST: CPT | Mod: MC

## 2024-09-08 PROCEDURE — 36415 COLL VENOUS BLD VENIPUNCTURE: CPT

## 2024-09-08 PROCEDURE — 99284 EMERGENCY DEPT VISIT MOD MDM: CPT | Mod: 25

## 2024-09-08 PROCEDURE — 85025 COMPLETE CBC W/AUTO DIFF WBC: CPT

## 2024-09-08 PROCEDURE — 83690 ASSAY OF LIPASE: CPT

## 2024-09-08 PROCEDURE — 99285 EMERGENCY DEPT VISIT HI MDM: CPT

## 2024-09-08 PROCEDURE — 96376 TX/PRO/DX INJ SAME DRUG ADON: CPT

## 2024-09-08 PROCEDURE — 96375 TX/PRO/DX INJ NEW DRUG ADDON: CPT

## 2024-09-08 PROCEDURE — 80076 HEPATIC FUNCTION PANEL: CPT

## 2024-09-08 PROCEDURE — 96374 THER/PROPH/DIAG INJ IV PUSH: CPT | Mod: XU

## 2024-09-08 RX ORDER — ONDANSETRON 2 MG/ML
4 INJECTION, SOLUTION INTRAMUSCULAR; INTRAVENOUS ONCE
Refills: 0 | Status: COMPLETED | OUTPATIENT
Start: 2024-09-08 | End: 2024-09-08

## 2024-09-08 RX ORDER — ONDANSETRON 2 MG/ML
1 INJECTION, SOLUTION INTRAMUSCULAR; INTRAVENOUS
Qty: 1 | Refills: 0
Start: 2024-09-08 | End: 2024-09-10

## 2024-09-08 RX ADMIN — ONDANSETRON 104 MILLIGRAM(S): 2 INJECTION, SOLUTION INTRAMUSCULAR; INTRAVENOUS at 12:33

## 2024-09-08 RX ADMIN — ONDANSETRON 4 MILLIGRAM(S): 2 INJECTION, SOLUTION INTRAMUSCULAR; INTRAVENOUS at 09:31

## 2024-09-08 RX ADMIN — Medication 4 MILLIGRAM(S): at 09:31

## 2024-09-08 NOTE — ED PROVIDER NOTE - PATIENT PORTAL LINK FT
You can access the FollowMyHealth Patient Portal offered by [facility] by registering at the following website: [email or web address]. By joining [employer]’s FollowMyHealth portal, you will also be able to view your health information using other applications (apps) compatible with our system.

## 2024-09-08 NOTE — ED PROVIDER NOTE - CLINICAL SUMMARY MEDICAL DECISION MAKING FREE TEXT BOX
Patient is an 84-year-old female presents for evaluation of coccyx pain status post 1 year ago patient had a known fracture after a fall was doing well and then presents for evaluation of spontaneous midline sacral and coccyx pain noted denies any new falls injury moments denies any loss of bladder or bowel control saddle anesthesia fevers chills vomiting she notes that she did feel nauseous 1 day prior with nondescript mild abdominal pain denies any chest pain shortness of breath fevers chills or diaphoresis    On physical exam patient is normocephalic atraumatic pupils equal round reactive light accommodation extraocular muscles intact oropharynx clear chest clear to auscultation bilaterally abdomen soft nontender nondistended bowel sounds positive no guarding or rebound extremities full range of motion pedal pulses 2+ radial pulse 2+    Assessment plan patient presents for evaluation of back pain located in the lower sacral coccyx region tenderness to palpation no signs of loss of bladder or bowel control no saddle anesthesia fevers chills this is not consistent with cauda equina not consistent with spinal epidural abscess given age and nondescript history we obtained CT scan as well as urine and labs patient given IV pain medicine improved here in the emergency department I will continue to monitor at this time

## 2024-09-08 NOTE — ED PROVIDER NOTE - NSFOLLOWUPINSTRUCTIONS_ED_ALL_ED_FT
Follow up with your primary care doctor in 1-2 days    Coccyx Injury    WHAT YOU NEED TO KNOW:    A coccyx (tailbone) injury is when your coccyx breaks, dislocates, or is not stable. The coccyx is a small bone shaped like a triangle that forms the bottom of your spine.    DISCHARGE INSTRUCTIONS:    Medicines: You may need any of the following:     NSAIDs decrease swelling and pain or fever. This medicine can be bought with or without a doctor's order. This medicine can cause stomach bleeding or kidney problems in certain people. If you take blood thinner medicine, always ask your healthcare provider if NSAIDs are safe for you. Always read the medicine label and follow the directions on it before using this medicine.      Prescription pain medicine may be given to decrease pain. Do not wait until the pain is severe before you take this medicine.      A bowel movement softener makes it easier and less painful for you to have a bowel movement.       Take your medicine as directed. Contact your healthcare provider if you think your medicine is not helping or if you have side effects. Tell him if you are allergic to any medicine. Keep a list of the medicines, vitamins, and herbs you take. Include the amounts, and when and why you take them. Bring the list or the pill bottles to follow-up visits. Carry your medicine list with you in case of an emergency.    Self-care:     Use a donut-shaped cushion to decrease pain and support your coccyx when you sit.      Ice helps decrease swelling and pain. Ice may also help prevent tissue damage. Use an ice pack, or put crushed ice in a plastic bag. Cover it with a towel and place it on your coccyx for 15 to 20 minutes every hour or as directed.      Sleep on a firm mattress. Place a pillow under your knees if you sleep on your back. Or, sleep on your side with a pillow between your knees. This will decrease pain and tension in your coccyx and back.     Follow up with your healthcare provider as directed: Write down your questions so you remember to ask them during your visits.     Contact your healthcare provider if:     You have trouble urinating or having a bowel movement.      Your pain or swelling get worse or do not go away with treatment.      You have a fever.      You have questions or concerns about your condition or care.    Return to the emergency department if:     You have trouble breathing.      You cannot move your legs.      Your legs suddenly go numb.      You have severe pain.         © Copyright [employer] 2019 All illustrations and images included in CareNotes are the copyrighted property of [email or web address] or [employer].

## 2024-09-08 NOTE — ED ADULT NURSE NOTE - NSICDXPASTMEDICALHX_GEN_ALL_CORE_FT
Patient called stating his dose was to be moved up from 2.5mg to 5 mg and to have enough for 28 days. Please advise.     Pharmacy is [facility] in [place].    PAST MEDICAL HISTORY:  COPD, mild     HTN (hypertension)     Hypercholesteremia

## 2024-09-08 NOTE — ED PROVIDER NOTE - ATTENDING APP SHARED VISIT CONTRIBUTION OF CARE
I have personally performed a history and physical exam on this patient and personally directed the management of the patient. Patient is an 84-year-old female presents for evaluation of coccyx pain status post 1 year ago patient had a known fracture after a fall was doing well and then presents for evaluation of spontaneous midline sacral and coccyx pain noted denies any new falls injury moments denies any loss of bladder or bowel control saddle anesthesia fevers chills vomiting she notes that she did feel nauseous 1 day prior with nondescript mild abdominal pain denies any chest pain shortness of breath fevers chills or diaphoresis    On physical exam patient is normocephalic atraumatic pupils equal round reactive light accommodation extraocular muscles intact oropharynx clear chest clear to auscultation bilaterally abdomen soft nontender nondistended bowel sounds positive no guarding or rebound extremities full range of motion pedal pulses 2+ radial pulse 2+    Assessment plan patient presents for evaluation of back pain located in the lower sacral coccyx region tenderness to palpation no signs of loss of bladder or bowel control no saddle anesthesia fevers chills this is not consistent with cauda equina not consistent with spinal epidural abscess given age and nondescript history we obtained CT scan as well as urine and labs patient given IV pain medicine improved here in the emergency department I will continue to monitor at this time

## 2024-09-08 NOTE — ED PROVIDER NOTE - OBJECTIVE STATEMENT
Patient is an 84-year-old female presents for evaluation of coccyx pain status post 1 year ago patient had a known fracture after a fall was doing well and then presents for evaluation of spontaneous midline sacral and coccyx pain noted denies any new falls injury moments denies any loss of bladder or bowel control saddle anesthesia fevers chills vomiting she notes that she did feel nauseous 1 day prior with nondescript mild abdominal pain denies any chest pain shortness of breath fevers chills or diaphoresis

## 2024-09-08 NOTE — ED PROVIDER NOTE - PHYSICAL EXAMINATION
On physical exam patient is normocephalic atraumatic pupils equal round reactive light accommodation extraocular muscles intact oropharynx clear chest clear to auscultation bilaterally abdomen soft nontender nondistended bowel sounds positive no guarding or rebound extremities full range of motion pedal pulses 2+ radial pulse 2+

## 2024-10-11 NOTE — ED PROVIDER NOTE - MUSCULOSKELETAL, MLM
Pt's wife June is returning a call she received. Please call pt and wife back at ph: [phone].    tenderness over L5 / S1 region, no swelling, no bruising

## 2024-11-12 ENCOUNTER — NON-APPOINTMENT (OUTPATIENT)
Age: 84
End: 2024-11-12

## 2024-11-18 ENCOUNTER — APPOINTMENT (OUTPATIENT)
Age: 84
End: 2024-11-18

## 2025-03-03 ENCOUNTER — APPOINTMENT (OUTPATIENT)
Dept: PULMONOLOGY | Facility: CLINIC | Age: 85
End: 2025-03-03

## 2025-03-31 ENCOUNTER — APPOINTMENT (OUTPATIENT)
Dept: PULMONOLOGY | Facility: CLINIC | Age: 85
End: 2025-03-31
Payer: MEDICARE

## 2025-03-31 VITALS
SYSTOLIC BLOOD PRESSURE: 110 MMHG | HEART RATE: 67 BPM | HEIGHT: 61 IN | RESPIRATION RATE: 12 BRPM | OXYGEN SATURATION: 96 % | WEIGHT: 121 LBS | BODY MASS INDEX: 22.84 KG/M2 | DIASTOLIC BLOOD PRESSURE: 60 MMHG

## 2025-03-31 DIAGNOSIS — R05.3 CHRONIC COUGH: ICD-10-CM

## 2025-03-31 DIAGNOSIS — J44.9 CHRONIC OBSTRUCTIVE PULMONARY DISEASE, UNSPECIFIED: ICD-10-CM

## 2025-03-31 DIAGNOSIS — R93.89 ABNORMAL FINDINGS ON DIAGNOSTIC IMAGING OF OTHER SPECIFIED BODY STRUCTURES: ICD-10-CM

## 2025-03-31 PROCEDURE — G2211 COMPLEX E/M VISIT ADD ON: CPT

## 2025-03-31 PROCEDURE — 99214 OFFICE O/P EST MOD 30 MIN: CPT

## 2025-09-15 ENCOUNTER — APPOINTMENT (OUTPATIENT)
Dept: PULMONOLOGY | Facility: HOSPITAL | Age: 85
End: 2025-09-15